# Patient Record
Sex: FEMALE | Race: WHITE | NOT HISPANIC OR LATINO | Employment: OTHER | URBAN - METROPOLITAN AREA
[De-identification: names, ages, dates, MRNs, and addresses within clinical notes are randomized per-mention and may not be internally consistent; named-entity substitution may affect disease eponyms.]

---

## 2018-01-01 ENCOUNTER — HOSPITAL ENCOUNTER (OUTPATIENT)
Dept: ULTRASOUND IMAGING | Facility: CLINIC | Age: 58
Discharge: HOME | End: 2018-07-10
Attending: INTERNAL MEDICINE | Admitting: RADIOLOGY

## 2018-01-01 ENCOUNTER — OFFICE VISIT - HEALTHEAST (OUTPATIENT)
Dept: GERIATRICS | Facility: CLINIC | Age: 58
End: 2018-01-01

## 2018-01-01 ENCOUNTER — HOSPITAL ENCOUNTER (OUTPATIENT)
Dept: ULTRASOUND IMAGING | Facility: CLINIC | Age: 58
Discharge: HOME | End: 2018-09-07
Attending: INTERNAL MEDICINE

## 2018-01-01 ENCOUNTER — AMBULATORY - HEALTHEAST (OUTPATIENT)
Dept: SCHEDULING | Facility: CLINIC | Age: 58
End: 2018-01-01

## 2018-01-01 ENCOUNTER — RECORDS - HEALTHEAST (OUTPATIENT)
Dept: LAB | Facility: CLINIC | Age: 58
End: 2018-01-01

## 2018-01-01 ENCOUNTER — AMBULATORY - HEALTHEAST (OUTPATIENT)
Dept: OTHER | Facility: CLINIC | Age: 58
End: 2018-01-01

## 2018-01-01 ENCOUNTER — HOSPITAL ENCOUNTER (OUTPATIENT)
Dept: ULTRASOUND IMAGING | Facility: CLINIC | Age: 58
Discharge: HOME | End: 2018-12-19
Attending: INTERNAL MEDICINE | Admitting: RADIOLOGY

## 2018-01-01 ENCOUNTER — HOSPITAL ENCOUNTER (OUTPATIENT)
Dept: ULTRASOUND IMAGING | Facility: CLINIC | Age: 58
Discharge: HOME | End: 2018-07-27
Attending: INTERNAL MEDICINE | Admitting: RADIOLOGY

## 2018-01-01 ENCOUNTER — HOSPITAL ENCOUNTER (OUTPATIENT)
Dept: ULTRASOUND IMAGING | Facility: CLINIC | Age: 58
Discharge: HOME | End: 2018-08-07
Attending: INTERNAL MEDICINE | Admitting: RADIOLOGY

## 2018-01-01 ENCOUNTER — HOSPITAL ENCOUNTER (OUTPATIENT)
Dept: ULTRASOUND IMAGING | Facility: CLINIC | Age: 58
Discharge: HOME | End: 2018-07-18
Attending: INTERNAL MEDICINE

## 2018-01-01 ENCOUNTER — HOSPITAL ENCOUNTER (OUTPATIENT)
Dept: ULTRASOUND IMAGING | Facility: CLINIC | Age: 58
Discharge: HOME | End: 2018-12-12
Attending: INTERNAL MEDICINE | Admitting: RADIOLOGY

## 2018-01-01 ENCOUNTER — HOSPITAL ENCOUNTER (OUTPATIENT)
Dept: ULTRASOUND IMAGING | Facility: CLINIC | Age: 58
Discharge: HOME | End: 2018-08-21
Attending: INTERNAL MEDICINE | Admitting: RADIOLOGY

## 2018-01-01 ENCOUNTER — HOSPITAL ENCOUNTER (OUTPATIENT)
Dept: ULTRASOUND IMAGING | Facility: HOSPITAL | Age: 58
Discharge: HOME | End: 2018-10-05
Attending: INTERNAL MEDICINE | Admitting: RADIOLOGY

## 2018-01-01 ENCOUNTER — COMMUNICATION - HEALTHEAST (OUTPATIENT)
Dept: TELEHEALTH | Facility: CLINIC | Age: 58
End: 2018-01-01

## 2018-01-01 ENCOUNTER — RECORDS - HEALTHEAST (OUTPATIENT)
Dept: ADMINISTRATIVE | Facility: OTHER | Age: 58
End: 2018-01-01

## 2018-01-01 ENCOUNTER — HOSPITAL ENCOUNTER (OUTPATIENT)
Dept: ULTRASOUND IMAGING | Facility: CLINIC | Age: 58
Discharge: HOME | End: 2018-08-14
Attending: INTERNAL MEDICINE | Admitting: RADIOLOGY

## 2018-01-01 ENCOUNTER — AMBULATORY - HEALTHEAST (OUTPATIENT)
Dept: LAB | Facility: CLINIC | Age: 58
End: 2018-01-01

## 2018-01-01 ENCOUNTER — HOSPITAL ENCOUNTER (OUTPATIENT)
Dept: ULTRASOUND IMAGING | Facility: CLINIC | Age: 58
Discharge: HOME | End: 2018-08-03
Attending: INTERNAL MEDICINE | Admitting: RADIOLOGY

## 2018-01-01 ENCOUNTER — HOSPITAL ENCOUNTER (OUTPATIENT)
Dept: ULTRASOUND IMAGING | Facility: CLINIC | Age: 58
Discharge: HOME | End: 2018-09-11
Attending: INTERNAL MEDICINE | Admitting: RADIOLOGY

## 2018-01-01 ENCOUNTER — HOSPITAL ENCOUNTER (OUTPATIENT)
Dept: ULTRASOUND IMAGING | Facility: CLINIC | Age: 58
Discharge: HOME | End: 2018-12-05
Attending: INTERNAL MEDICINE | Admitting: RADIOLOGY

## 2018-01-01 ENCOUNTER — HOSPITAL ENCOUNTER (OUTPATIENT)
Dept: ULTRASOUND IMAGING | Facility: CLINIC | Age: 58
Discharge: HOME | End: 2018-09-18
Attending: INTERNAL MEDICINE | Admitting: RADIOLOGY

## 2018-01-01 ENCOUNTER — HOSPITAL ENCOUNTER (OUTPATIENT)
Dept: ULTRASOUND IMAGING | Facility: HOSPITAL | Age: 58
Discharge: HOME | End: 2018-10-03
Attending: INTERNAL MEDICINE | Admitting: RADIOLOGY

## 2018-01-01 ENCOUNTER — HOSPITAL ENCOUNTER (OUTPATIENT)
Dept: ULTRASOUND IMAGING | Facility: HOSPITAL | Age: 58
Discharge: HOME | End: 2018-10-09
Attending: INTERNAL MEDICINE | Admitting: RADIOLOGY

## 2018-01-01 ENCOUNTER — HOSPITAL ENCOUNTER (OUTPATIENT)
Dept: ULTRASOUND IMAGING | Facility: CLINIC | Age: 58
Discharge: HOME | End: 2018-11-09
Attending: INTERNAL MEDICINE | Admitting: RADIOLOGY

## 2018-01-01 ENCOUNTER — HOSPITAL ENCOUNTER (OUTPATIENT)
Dept: ULTRASOUND IMAGING | Facility: HOSPITAL | Age: 58
Discharge: HOME | End: 2018-11-13
Attending: INTERNAL MEDICINE | Admitting: RADIOLOGY

## 2018-01-01 ENCOUNTER — HOSPITAL ENCOUNTER (OUTPATIENT)
Dept: ULTRASOUND IMAGING | Facility: CLINIC | Age: 58
Discharge: HOME | End: 2018-09-25
Attending: INTERNAL MEDICINE | Admitting: RADIOLOGY

## 2018-01-01 ENCOUNTER — HOSPITAL ENCOUNTER (OUTPATIENT)
Dept: ULTRASOUND IMAGING | Facility: CLINIC | Age: 58
Discharge: HOME | End: 2018-09-19
Attending: INTERNAL MEDICINE

## 2018-01-01 ENCOUNTER — HOSPITAL ENCOUNTER (OUTPATIENT)
Dept: ULTRASOUND IMAGING | Facility: CLINIC | Age: 58
Discharge: HOME | End: 2018-11-05
Attending: INTERNAL MEDICINE | Admitting: RADIOLOGY

## 2018-01-01 ENCOUNTER — HOSPITAL ENCOUNTER (OUTPATIENT)
Dept: ULTRASOUND IMAGING | Facility: HOSPITAL | Age: 58
Discharge: HOME | End: 2018-09-28
Attending: INTERNAL MEDICINE | Admitting: RADIOLOGY

## 2018-01-01 ENCOUNTER — HOSPITAL ENCOUNTER (OUTPATIENT)
Dept: ULTRASOUND IMAGING | Facility: CLINIC | Age: 58
Discharge: HOME | End: 2018-08-17
Attending: INTERNAL MEDICINE | Admitting: RADIOLOGY

## 2018-01-01 ENCOUNTER — MEDICAL CORRESPONDENCE (OUTPATIENT)
Dept: HEALTH INFORMATION MANAGEMENT | Facility: CLINIC | Age: 58
End: 2018-01-01

## 2018-01-01 ENCOUNTER — HOSPITAL ENCOUNTER (OUTPATIENT)
Dept: CARDIOLOGY | Facility: CLINIC | Age: 58
Discharge: HOME | End: 2018-07-26
Attending: INTERNAL MEDICINE

## 2018-01-01 ENCOUNTER — HOSPITAL ENCOUNTER (OUTPATIENT)
Dept: ULTRASOUND IMAGING | Facility: CLINIC | Age: 58
Discharge: HOME | End: 2018-06-22
Attending: INTERNAL MEDICINE | Admitting: INTERNAL MEDICINE

## 2018-01-01 ENCOUNTER — REFERRAL (OUTPATIENT)
Dept: TRANSPLANT | Facility: CLINIC | Age: 58
End: 2018-01-01

## 2018-01-01 ENCOUNTER — HOSPITAL ENCOUNTER (OUTPATIENT)
Dept: ULTRASOUND IMAGING | Facility: CLINIC | Age: 58
Discharge: HOME | End: 2018-07-24
Attending: INTERNAL MEDICINE | Admitting: RADIOLOGY

## 2018-01-01 ENCOUNTER — HOSPITAL ENCOUNTER (OUTPATIENT)
Dept: ULTRASOUND IMAGING | Facility: CLINIC | Age: 58
Discharge: HOME | End: 2018-07-20
Attending: INTERNAL MEDICINE | Admitting: RADIOLOGY

## 2018-01-01 ENCOUNTER — HOSPITAL ENCOUNTER (OUTPATIENT)
Dept: ULTRASOUND IMAGING | Facility: CLINIC | Age: 58
Discharge: HOME | End: 2018-09-04
Attending: INTERNAL MEDICINE | Admitting: RADIOLOGY

## 2018-01-01 ENCOUNTER — HOSPITAL ENCOUNTER (OUTPATIENT)
Dept: ULTRASOUND IMAGING | Facility: HOSPITAL | Age: 58
Discharge: HOME | End: 2018-10-22
Attending: INTERNAL MEDICINE | Admitting: RADIOLOGY

## 2018-01-01 ENCOUNTER — HOSPITAL ENCOUNTER (OUTPATIENT)
Dept: ULTRASOUND IMAGING | Facility: CLINIC | Age: 58
Discharge: HOME | End: 2018-11-30
Attending: INTERNAL MEDICINE | Admitting: RADIOLOGY

## 2018-01-01 ENCOUNTER — HOSPITAL ENCOUNTER (OUTPATIENT)
Dept: ULTRASOUND IMAGING | Facility: HOSPITAL | Age: 58
Discharge: HOME | End: 2018-09-21
Attending: INTERNAL MEDICINE | Admitting: RADIOLOGY

## 2018-01-01 ENCOUNTER — HOSPITAL ENCOUNTER (OUTPATIENT)
Dept: ULTRASOUND IMAGING | Facility: HOSPITAL | Age: 58
Discharge: HOME | End: 2018-10-31
Attending: INTERNAL MEDICINE | Admitting: RADIOLOGY

## 2018-01-01 ENCOUNTER — HOSPITAL ENCOUNTER (OUTPATIENT)
Dept: ULTRASOUND IMAGING | Facility: CLINIC | Age: 58
Discharge: HOME | End: 2018-08-28
Attending: INTERNAL MEDICINE | Admitting: RADIOLOGY

## 2018-01-01 ENCOUNTER — HOSPITAL ENCOUNTER (OUTPATIENT)
Dept: ULTRASOUND IMAGING | Facility: HOSPITAL | Age: 58
Discharge: HOME | End: 2018-07-06
Attending: INTERNAL MEDICINE | Admitting: RADIOLOGY

## 2018-01-01 ENCOUNTER — HOSPITAL ENCOUNTER (OUTPATIENT)
Dept: ULTRASOUND IMAGING | Facility: CLINIC | Age: 58
Discharge: HOME | End: 2018-06-26
Attending: INTERNAL MEDICINE | Admitting: RADIOLOGY

## 2018-01-01 ENCOUNTER — COMMUNICATION - HEALTHEAST (OUTPATIENT)
Dept: INTERNAL MEDICINE | Facility: CLINIC | Age: 58
End: 2018-01-01

## 2018-01-01 ENCOUNTER — HOME CARE/HOSPICE - HEALTHEAST (OUTPATIENT)
Dept: HOME HEALTH SERVICES | Facility: HOME HEALTH | Age: 58
End: 2018-01-01

## 2018-01-01 ENCOUNTER — HOSPITAL ENCOUNTER (OUTPATIENT)
Dept: ULTRASOUND IMAGING | Facility: HOSPITAL | Age: 58
Discharge: HOME | End: 2018-10-26
Attending: INTERNAL MEDICINE | Admitting: RADIOLOGY

## 2018-01-01 ENCOUNTER — AMBULATORY - HEALTHEAST (OUTPATIENT)
Dept: GERIATRICS | Facility: CLINIC | Age: 58
End: 2018-01-01

## 2018-01-01 ENCOUNTER — HOSPITAL ENCOUNTER (OUTPATIENT)
Dept: ULTRASOUND IMAGING | Facility: CLINIC | Age: 58
Discharge: HOME | End: 2018-09-14
Attending: INTERNAL MEDICINE

## 2018-01-01 ENCOUNTER — HOSPITAL ENCOUNTER (OUTPATIENT)
Dept: ULTRASOUND IMAGING | Facility: CLINIC | Age: 58
Discharge: HOME | End: 2018-06-12
Attending: INTERNAL MEDICINE | Admitting: RADIOLOGY

## 2018-01-01 ENCOUNTER — HOSPITAL ENCOUNTER (OUTPATIENT)
Dept: ULTRASOUND IMAGING | Facility: CLINIC | Age: 58
Discharge: HOME | End: 2018-08-24
Attending: INTERNAL MEDICINE | Admitting: RADIOLOGY

## 2018-01-01 ENCOUNTER — HOSPITAL ENCOUNTER (OUTPATIENT)
Dept: ULTRASOUND IMAGING | Facility: CLINIC | Age: 58
Discharge: HOME | End: 2018-08-10
Attending: INTERNAL MEDICINE | Admitting: RADIOLOGY

## 2018-01-01 ENCOUNTER — HOSPITAL ENCOUNTER (OUTPATIENT)
Dept: ULTRASOUND IMAGING | Facility: HOSPITAL | Age: 58
Discharge: HOME | End: 2018-10-12
Attending: INTERNAL MEDICINE | Admitting: RADIOLOGY

## 2018-01-01 ENCOUNTER — HOSPITAL ENCOUNTER (OUTPATIENT)
Dept: ULTRASOUND IMAGING | Facility: CLINIC | Age: 58
Discharge: HOME | End: 2018-05-29
Attending: INTERNAL MEDICINE | Admitting: RADIOLOGY

## 2018-01-01 ENCOUNTER — HOSPITAL ENCOUNTER (OUTPATIENT)
Dept: ULTRASOUND IMAGING | Facility: CLINIC | Age: 58
Discharge: HOME | End: 2018-06-29
Attending: INTERNAL MEDICINE | Admitting: RADIOLOGY

## 2018-01-01 ENCOUNTER — HOSPITAL ENCOUNTER (OUTPATIENT)
Dept: ULTRASOUND IMAGING | Facility: CLINIC | Age: 58
Discharge: HOME | End: 2018-12-26
Attending: INTERNAL MEDICINE | Admitting: RADIOLOGY

## 2018-01-01 ENCOUNTER — HOSPITAL ENCOUNTER (OUTPATIENT)
Dept: ULTRASOUND IMAGING | Facility: CLINIC | Age: 58
Discharge: HOME | End: 2018-06-19
Attending: INTERNAL MEDICINE | Admitting: RADIOLOGY

## 2018-01-01 ENCOUNTER — HOSPITAL ENCOUNTER (OUTPATIENT)
Dept: ULTRASOUND IMAGING | Facility: CLINIC | Age: 58
Discharge: HOME | End: 2018-06-15
Attending: INTERNAL MEDICINE | Admitting: RADIOLOGY

## 2018-01-01 ENCOUNTER — HOSPITAL ENCOUNTER (OUTPATIENT)
Dept: ULTRASOUND IMAGING | Facility: CLINIC | Age: 58
Discharge: HOME | End: 2018-07-31
Attending: INTERNAL MEDICINE | Admitting: RADIOLOGY

## 2018-01-01 ENCOUNTER — AMBULATORY - HEALTHEAST (OUTPATIENT)
Dept: FAMILY MEDICINE | Facility: CLINIC | Age: 58
End: 2018-01-01

## 2018-01-01 ENCOUNTER — HOSPITAL ENCOUNTER (OUTPATIENT)
Dept: ULTRASOUND IMAGING | Facility: HOSPITAL | Age: 58
Discharge: HOME | End: 2018-11-16
Attending: INTERNAL MEDICINE | Admitting: RADIOLOGY

## 2018-01-01 ENCOUNTER — HOSPITAL ENCOUNTER (OUTPATIENT)
Dept: ULTRASOUND IMAGING | Facility: CLINIC | Age: 58
Discharge: HOME | End: 2018-07-17
Attending: INTERNAL MEDICINE | Admitting: RADIOLOGY

## 2018-01-01 ENCOUNTER — HOSPITAL ENCOUNTER (OUTPATIENT)
Dept: ULTRASOUND IMAGING | Facility: CLINIC | Age: 58
Discharge: HOME | End: 2018-06-05
Attending: INTERNAL MEDICINE | Admitting: RADIOLOGY

## 2018-01-01 ENCOUNTER — HOSPITAL ENCOUNTER (OUTPATIENT)
Dept: ULTRASOUND IMAGING | Facility: CLINIC | Age: 58
Discharge: HOME | End: 2018-07-13
Attending: INTERNAL MEDICINE | Admitting: RADIOLOGY

## 2018-01-01 ENCOUNTER — HOSPITAL ENCOUNTER (OUTPATIENT)
Dept: ULTRASOUND IMAGING | Facility: CLINIC | Age: 58
Discharge: HOME | End: 2018-08-31
Attending: INTERNAL MEDICINE | Admitting: RADIOLOGY

## 2018-01-01 DIAGNOSIS — K70.31 ALCOHOLIC CIRRHOSIS OF LIVER WITH ASCITES (H): ICD-10-CM

## 2018-01-01 DIAGNOSIS — E87.6 HYPOKALEMIA: ICD-10-CM

## 2018-01-01 DIAGNOSIS — E83.42 HYPOMAGNESEMIA: ICD-10-CM

## 2018-01-01 DIAGNOSIS — K70.31 ASCITES DUE TO ALCOHOLIC CIRRHOSIS (H): ICD-10-CM

## 2018-01-01 DIAGNOSIS — R60.9 DEPENDENT EDEMA: ICD-10-CM

## 2018-01-01 DIAGNOSIS — E83.119 HEMOCHROMATOSIS, UNSPECIFIED HEMOCHROMATOSIS TYPE: ICD-10-CM

## 2018-01-01 DIAGNOSIS — K74.60 CIRRHOSIS (H): ICD-10-CM

## 2018-01-01 DIAGNOSIS — F51.02 INSOMNIA DUE TO STRESS: ICD-10-CM

## 2018-01-01 DIAGNOSIS — E83.119 HEMOCHROMATOSIS: ICD-10-CM

## 2018-01-01 DIAGNOSIS — I85.00 ESOPHAGEAL VARICES WITHOUT BLEEDING (H): ICD-10-CM

## 2018-01-01 DIAGNOSIS — I95.9 HYPOTENSION: ICD-10-CM

## 2018-01-01 DIAGNOSIS — F43.22 ADJUSTMENT DISORDER WITH ANXIOUS MOOD: ICD-10-CM

## 2018-01-01 DIAGNOSIS — D62 ACUTE BLOOD LOSS ANEMIA: ICD-10-CM

## 2018-01-01 DIAGNOSIS — K76.82 HEPATIC ENCEPHALOPATHY (H): ICD-10-CM

## 2018-01-01 DIAGNOSIS — N30.00 ACUTE CYSTITIS WITHOUT HEMATURIA: ICD-10-CM

## 2018-01-01 DIAGNOSIS — E46 PROTEIN-CALORIE MALNUTRITION (H): ICD-10-CM

## 2018-01-01 DIAGNOSIS — F06.30 MOOD DISORDER DUE TO A GENERAL MEDICAL CONDITION: ICD-10-CM

## 2018-01-01 DIAGNOSIS — F41.9 ANXIETY DISORDER, UNSPECIFIED TYPE: ICD-10-CM

## 2018-01-01 DIAGNOSIS — R00.0 TACHYCARDIA: ICD-10-CM

## 2018-01-01 DIAGNOSIS — I10 ELEVATED BLOOD PRESSURE READING IN OFFICE WITH DIAGNOSIS OF HYPERTENSION: ICD-10-CM

## 2018-01-01 DIAGNOSIS — I85.10 SECONDARY ESOPHAGEAL VARICES WITHOUT BLEEDING (H): ICD-10-CM

## 2018-01-01 DIAGNOSIS — K76.6 PORTAL HYPERTENSION (H): ICD-10-CM

## 2018-01-01 LAB
ALBUMIN FLD-MCNC: <0.6 G/DL
ALBUMIN SERPL-MCNC: 1.5 G/DL (ref 3.5–5)
ALBUMIN SERPL-MCNC: 1.8 G/DL (ref 3.5–5)
ALBUMIN SERPL-MCNC: 2.9 G/DL (ref 3.5–5)
ALBUMIN SERPL-MCNC: 3.2 G/DL (ref 3.5–5)
ALP SERPL-CCNC: 169 U/L (ref 45–120)
ALP SERPL-CCNC: 179 U/L (ref 45–120)
ALP SERPL-CCNC: 186 U/L (ref 45–120)
ALP SERPL-CCNC: 217 U/L (ref 45–120)
ALT SERPL W P-5'-P-CCNC: 19 U/L (ref 0–45)
ALT SERPL W P-5'-P-CCNC: 26 U/L (ref 0–45)
ALT SERPL W P-5'-P-CCNC: 27 U/L (ref 0–45)
ALT SERPL W P-5'-P-CCNC: 27 U/L (ref 0–45)
AMMONIA PLAS-SCNC: 38 UMOL/L (ref 11–35)
ANION GAP SERPL CALCULATED.3IONS-SCNC: 10 MMOL/L (ref 5–18)
ANION GAP SERPL CALCULATED.3IONS-SCNC: 11 MMOL/L (ref 5–18)
ANION GAP SERPL CALCULATED.3IONS-SCNC: 12 MMOL/L (ref 5–18)
ANION GAP SERPL CALCULATED.3IONS-SCNC: 4 MMOL/L (ref 5–18)
ANION GAP SERPL CALCULATED.3IONS-SCNC: 8 MMOL/L (ref 5–18)
ANION GAP SERPL CALCULATED.3IONS-SCNC: 8 MMOL/L (ref 5–18)
ANION GAP SERPL CALCULATED.3IONS-SCNC: 9 MMOL/L (ref 5–18)
ANION GAP SERPL CALCULATED.3IONS-SCNC: 9 MMOL/L (ref 5–18)
AORTIC ROOT: 3.1 CM
APPEARANCE FLD: ABNORMAL
AST SERPL W P-5'-P-CCNC: 39 U/L (ref 0–40)
AST SERPL W P-5'-P-CCNC: 58 U/L (ref 0–40)
AST SERPL W P-5'-P-CCNC: 96 U/L (ref 0–40)
AST SERPL W P-5'-P-CCNC: 97 U/L (ref 0–40)
BACTERIA SPEC CULT: NO GROWTH
BASOPHILS # BLD AUTO: 0 THOU/UL (ref 0–0.2)
BASOPHILS NFR BLD AUTO: 1 % (ref 0–2)
BILIRUB DIRECT SERPL-MCNC: 1.2 MG/DL
BILIRUB DIRECT SERPL-MCNC: 1.4 MG/DL
BILIRUB SERPL-MCNC: 2.3 MG/DL (ref 0–1)
BILIRUB SERPL-MCNC: 2.8 MG/DL (ref 0–1)
BILIRUB SERPL-MCNC: 7.6 MG/DL (ref 0–1)
BILIRUB SERPL-MCNC: 8.5 MG/DL (ref 0–1)
BSA FOR ECHO PROCEDURE: 1.77 M2
BUN SERPL-MCNC: 10 MG/DL (ref 8–22)
BUN SERPL-MCNC: 11 MG/DL (ref 8–28)
BUN SERPL-MCNC: 12 MG/DL (ref 8–22)
BUN SERPL-MCNC: 6 MG/DL (ref 8–22)
BUN SERPL-MCNC: 6 MG/DL (ref 8–22)
BUN SERPL-MCNC: 7 MG/DL (ref 8–22)
BUN SERPL-MCNC: 9 MG/DL (ref 8–22)
CALCIUM SERPL-MCNC: 7.8 MG/DL (ref 8.5–10.5)
CALCIUM SERPL-MCNC: 8.1 MG/DL (ref 8.5–10.5)
CALCIUM SERPL-MCNC: 8.2 MG/DL (ref 8.5–10.5)
CALCIUM SERPL-MCNC: 8.7 MG/DL (ref 8.5–10.5)
CALCIUM SERPL-MCNC: 8.8 MG/DL (ref 8.5–10.5)
CALCIUM SERPL-MCNC: 8.9 MG/DL (ref 8.5–10.5)
CALCIUM SERPL-MCNC: 9.1 MG/DL (ref 8.5–10.5)
CALCIUM SERPL-MCNC: 9.1 MG/DL (ref 8.5–10.5)
CALCIUM SERPL-MCNC: 9.4 MG/DL (ref 8.5–10.5)
CALCIUM SERPL-MCNC: 9.5 MG/DL (ref 8.5–10.5)
CALCIUM SERPL-MCNC: 9.6 MG/DL (ref 8.5–10.5)
CALCIUM SERPL-MCNC: 9.6 MG/DL (ref 8.5–10.5)
CHLORIDE BLD-SCNC: 101 MMOL/L (ref 98–107)
CHLORIDE BLD-SCNC: 101 MMOL/L (ref 98–107)
CHLORIDE BLD-SCNC: 102 MMOL/L (ref 98–107)
CHLORIDE BLD-SCNC: 103 MMOL/L (ref 98–107)
CHLORIDE BLD-SCNC: 104 MMOL/L (ref 98–107)
CHLORIDE BLD-SCNC: 107 MMOL/L (ref 98–107)
CHLORIDE BLD-SCNC: 98 MMOL/L (ref 98–107)
CHLORIDE BLD-SCNC: 99 MMOL/L (ref 98–107)
CHLORIDE BLD-SCNC: 99 MMOL/L (ref 98–107)
CO2 SERPL-SCNC: 22 MMOL/L (ref 22–31)
CO2 SERPL-SCNC: 24 MMOL/L (ref 22–31)
CO2 SERPL-SCNC: 26 MMOL/L (ref 22–31)
CO2 SERPL-SCNC: 26 MMOL/L (ref 22–31)
CO2 SERPL-SCNC: 27 MMOL/L (ref 22–31)
COLOR FLD: YELLOW
CREAT SERPL-MCNC: 0.59 MG/DL (ref 0.6–1.1)
CREAT SERPL-MCNC: 0.62 MG/DL (ref 0.6–1.1)
CREAT SERPL-MCNC: 0.66 MG/DL (ref 0.6–1.1)
CREAT SERPL-MCNC: 0.68 MG/DL (ref 0.6–1.1)
CREAT SERPL-MCNC: 0.7 MG/DL (ref 0.6–1.1)
CREAT SERPL-MCNC: 0.71 MG/DL (ref 0.6–1.1)
CREAT SERPL-MCNC: 0.74 MG/DL (ref 0.6–1.1)
CREAT SERPL-MCNC: 0.75 MG/DL (ref 0.6–1.1)
CREAT SERPL-MCNC: 0.76 MG/DL (ref 0.6–1.1)
CREAT SERPL-MCNC: 0.76 MG/DL (ref 0.6–1.1)
CREAT SERPL-MCNC: 0.79 MG/DL (ref 0.6–1.1)
CREAT SERPL-MCNC: 0.82 MG/DL (ref 0.6–1.1)
CV BLOOD PRESSURE: NORMAL MMHG
CV ECHO HEIGHT: 67.5 IN
CV ECHO WEIGHT: 146 LBS
DOP CALC LVOT AREA: 3.14 CM2
DOP CALC LVOT DIAMETER: 2 CM
DOP CALC LVOT PEAK VEL: 105 CM/S
DOP CALC LVOT STROKE VOLUME: 64.7 CM3
DOP CALC MV VTI: 26.8 CM
DOP CALCLVOT PEAK VEL VTI: 20.6 CM
EJECTION FRACTION: 67 % (ref 55–75)
EOSINOPHIL # BLD AUTO: 0.1 THOU/UL (ref 0–0.4)
EOSINOPHIL NFR BLD AUTO: 3 % (ref 0–6)
EOSINOPHIL NFR FLD MANUAL: ABNORMAL %
ERYTHROCYTE [DISTWIDTH] IN BLOOD BY AUTOMATED COUNT: 17.6 % (ref 11–14.5)
ERYTHROCYTE [DISTWIDTH] IN BLOOD BY AUTOMATED COUNT: 18.3 % (ref 11–14.5)
ERYTHROCYTE [DISTWIDTH] IN BLOOD BY AUTOMATED COUNT: 19.3 % (ref 11–14.5)
ERYTHROCYTE [DISTWIDTH] IN BLOOD BY AUTOMATED COUNT: 20.2 % (ref 11–14.5)
ERYTHROCYTE [DISTWIDTH] IN BLOOD BY AUTOMATED COUNT: 26.7 % (ref 11–14.5)
FERRITIN SERPL-MCNC: 29 NG/ML (ref 10–130)
FRACTIONAL SHORTENING: 47.4 % (ref 28–44)
GFR SERPL CREATININE-BSD FRML MDRD: >60 ML/MIN/1.73M2
GLUCOSE BLD-MCNC: 101 MG/DL (ref 70–125)
GLUCOSE BLD-MCNC: 108 MG/DL (ref 70–125)
GLUCOSE BLD-MCNC: 112 MG/DL (ref 70–125)
GLUCOSE BLD-MCNC: 125 MG/DL (ref 70–125)
GLUCOSE BLD-MCNC: 129 MG/DL (ref 70–125)
GLUCOSE BLD-MCNC: 137 MG/DL (ref 70–125)
GLUCOSE BLD-MCNC: 146 MG/DL (ref 70–125)
GLUCOSE BLD-MCNC: 159 MG/DL (ref 70–125)
GLUCOSE BLD-MCNC: 168 MG/DL (ref 70–125)
GLUCOSE BLD-MCNC: 176 MG/DL (ref 70–125)
GLUCOSE BLD-MCNC: 205 MG/DL (ref 70–125)
GLUCOSE BLD-MCNC: 94 MG/DL (ref 70–125)
GRAM STAIN RESULT: NORMAL
GRAM STAIN RESULT: NORMAL
HCT VFR BLD AUTO: 27 % (ref 35–47)
HCT VFR BLD AUTO: 30.7 % (ref 35–47)
HCT VFR BLD AUTO: 31.9 % (ref 35–47)
HCT VFR BLD AUTO: 33.9 % (ref 35–47)
HCT VFR BLD AUTO: 34.1 % (ref 35–47)
HGB BLD-MCNC: 10 G/DL (ref 12–16)
HGB BLD-MCNC: 10.1 G/DL (ref 12–16)
HGB BLD-MCNC: 10.2 G/DL (ref 12–16)
HGB BLD-MCNC: 10.6 G/DL (ref 12–16)
HGB BLD-MCNC: 8.5 G/DL (ref 12–16)
HGB BLD-MCNC: 8.9 G/DL (ref 12–16)
HGB BLD-MCNC: 9.3 G/DL (ref 12–16)
INR PPP: 1.24 (ref 0.9–1.1)
INR PPP: 1.25 (ref 0.9–1.1)
INR PPP: 1.27 (ref 0.9–1.1)
INR PPP: 1.31 (ref 0.9–1.1)
INR PPP: 1.36 (ref 0.9–1.1)
INR PPP: 1.41 (ref 0.9–1.1)
INR PPP: 1.48 (ref 0.9–1.1)
INR PPP: 1.55 (ref 0.9–1.1)
INR PPP: 1.66 (ref 0.9–1.1)
INR PPP: 1.85 (ref 0.9–1.1)
INR PPP: 2.07 (ref 0.9–1.1)
INR PPP: 2.2 (ref 0.9–1.1)
INR PPP: 2.42 (ref 0.9–1.1)
INR PPP: 2.52 (ref 0.9–1.1)
INR PPP: 3.14 (ref 0.9–1.1)
INTERVENTRICULAR SEPTUM IN END DIASTOLE: 0.88 CM (ref 0.6–0.9)
IRON SERPL-MCNC: 34 UG/DL (ref 42–175)
IVS/PW RATIO: 1
LAB AP CHARGES (HE HISTORICAL CONVERSION): NORMAL
LAB MED GENERAL PATH INTERP (HE HISTORICAL CONVERSION): NORMAL
LEFT ATRIUM SIZE: 4 CM
LEFT ATRIUM TO AORTIC ROOT RATIO: 1.29 NO UNITS
LEFT VENTRICLE CARDIAC INDEX: 3.3 L/MIN/M2
LEFT VENTRICLE CARDIAC OUTPUT: 5.9 L/MIN
LEFT VENTRICLE DIASTOLIC VOLUME INDEX: 49.3 CM3/M2 (ref 34–74)
LEFT VENTRICLE DIASTOLIC VOLUME: 87.2 CM3 (ref 46–106)
LEFT VENTRICLE HEART RATE: 91 BPM
LEFT VENTRICLE MASS INDEX: 99.5 G/M2
LEFT VENTRICLE SYSTOLIC VOLUME INDEX: 16.3 CM3/M2 (ref 11–31)
LEFT VENTRICLE SYSTOLIC VOLUME: 28.9 CM3 (ref 14–42)
LEFT VENTRICULAR INTERNAL DIMENSION IN DIASTOLE: 5.49 CM (ref 3.8–5.2)
LEFT VENTRICULAR INTERNAL DIMENSION IN SYSTOLE: 2.89 CM (ref 2.2–3.5)
LEFT VENTRICULAR MASS: 176.1 G
LEFT VENTRICULAR OUTFLOW TRACT MEAN GRADIENT: 2 MMHG
LEFT VENTRICULAR OUTFLOW TRACT MEAN VELOCITY: 68 CM/S
LEFT VENTRICULAR OUTFLOW TRACT PEAK GRADIENT: 4 MMHG
LEFT VENTRICULAR POSTERIOR WALL IN END DIASTOLE: 0.85 CM (ref 0.6–0.9)
LV STROKE VOLUME INDEX: 36.5 ML/M2
LYMPHOCYTES # BLD AUTO: 0.3 THOU/UL (ref 0.8–4.4)
LYMPHOCYTES NFR BLD AUTO: 10 % (ref 20–40)
LYMPHOCYTES NFR FLD MANUAL: 10 %
MACROPHAGE % - HISTORICAL: 74 %
MAGNESIUM SERPL-MCNC: 1.1 MG/DL (ref 1.8–2.6)
MAGNESIUM SERPL-MCNC: 1.3 MG/DL (ref 1.8–2.6)
MAGNESIUM SERPL-MCNC: 1.3 MG/DL (ref 1.8–2.6)
MAGNESIUM SERPL-MCNC: 1.4 MG/DL (ref 1.8–2.6)
MAGNESIUM SERPL-MCNC: 1.5 MG/DL (ref 1.8–2.6)
MAGNESIUM SERPL-MCNC: 1.5 MG/DL (ref 1.8–2.6)
MAGNESIUM SERPL-MCNC: 1.7 MG/DL (ref 1.8–2.6)
MAGNESIUM SERPL-MCNC: 2.1 MG/DL (ref 1.8–2.6)
MCH RBC QN AUTO: 26.6 PG (ref 27–34)
MCH RBC QN AUTO: 29.5 PG (ref 27–34)
MCH RBC QN AUTO: 32 PG (ref 27–34)
MCH RBC QN AUTO: 37.6 PG (ref 27–34)
MCH RBC QN AUTO: 38.1 PG (ref 27–34)
MCHC RBC AUTO-ENTMCNC: 29.6 G/DL (ref 32–36)
MCHC RBC AUTO-ENTMCNC: 30.3 G/DL (ref 32–36)
MCHC RBC AUTO-ENTMCNC: 31.3 G/DL (ref 32–36)
MCHC RBC AUTO-ENTMCNC: 31.5 G/DL (ref 32–36)
MCHC RBC AUTO-ENTMCNC: 32 G/DL (ref 32–36)
MCV RBC AUTO: 100 FL (ref 80–100)
MCV RBC AUTO: 102 FL (ref 80–100)
MCV RBC AUTO: 119 FL (ref 80–100)
MCV RBC AUTO: 120 FL (ref 80–100)
MCV RBC AUTO: 88 FL (ref 80–100)
MESOTHELIALS, FLUID: 6 %
MITRAL VALVE E/A RATIO: 0.8
MITRAL VALVE MEAN INFLOW VELOCITY: 86.7 CM/S
MITRAL VALVE PEAK VELOCITY: 116 CM/S
MONOCYTE % - HISTORICAL: ABNORMAL %
MONOCYTES # BLD AUTO: 0.5 THOU/UL (ref 0–0.9)
MONOCYTES NFR BLD AUTO: 17 % (ref 2–10)
MV AREA VTI: 2.41 CM2
MV AVERAGE E/E' RATIO: 7.1 CM/S
MV DECELERATION TIME: 232 MS
MV E'TISSUE VEL-LAT: 11.8 CM/S
MV E'TISSUE VEL-MED: 11.1 CM/S
MV LATERAL E/E' RATIO: 6.9
MV MEAN GRADIENT: 3 MMHG
MV MEDIAL E/E' RATIO: 7.3
MV PEAK A VELOCITY: 98 CM/S
MV PEAK E VELOCITY: 81.5 CM/S
MV PEAK GRADIENT: 5.4 MMHG
MV VALVE AREA BY CONTINUITY EQUATION: 2.4 CM2
NEUTROPHILS # BLD AUTO: 2 THOU/UL (ref 2–7.7)
NEUTROPHILS NFR BLD AUTO: 70 % (ref 50–70)
NEUTS BAND NFR FLD MANUAL: 10 %
NUC REST DIASTOLIC VOLUME INDEX: 2336 LBS
NUC REST SYSTOLIC VOLUME INDEX: 67.5 IN
OTHER CELLS FLD MANUAL: ABNORMAL %
PATH REPORT.COMMENTS IMP SPEC: NORMAL
PATH REPORT.FINAL DX SPEC: NORMAL
PATH REPORT.RELEVANT HX SPEC: NORMAL
PLATELET # BLD AUTO: 104 THOU/UL (ref 140–440)
PLATELET # BLD AUTO: 133 THOU/UL (ref 140–440)
PLATELET # BLD AUTO: 138 THOU/UL (ref 140–440)
PLATELET # BLD AUTO: 79 THOU/UL (ref 140–440)
PLATELET # BLD AUTO: 93 THOU/UL (ref 140–440)
PMV BLD AUTO: 10 FL (ref 8.5–12.5)
PMV BLD AUTO: 9.2 FL (ref 8.5–12.5)
PMV BLD AUTO: 9.3 FL (ref 8.5–12.5)
PMV BLD AUTO: 9.3 FL (ref 8.5–12.5)
PMV BLD AUTO: 9.6 FL (ref 8.5–12.5)
POTASSIUM BLD-SCNC: 2.6 MMOL/L (ref 3.5–5)
POTASSIUM BLD-SCNC: 2.8 MMOL/L (ref 3.5–5)
POTASSIUM BLD-SCNC: 3 MMOL/L (ref 3.5–5)
POTASSIUM BLD-SCNC: 3.3 MMOL/L (ref 3.5–5)
POTASSIUM BLD-SCNC: 3.3 MMOL/L (ref 3.5–5)
POTASSIUM BLD-SCNC: 3.5 MMOL/L (ref 3.5–5)
POTASSIUM BLD-SCNC: 3.5 MMOL/L (ref 3.5–5)
POTASSIUM BLD-SCNC: 4 MMOL/L (ref 3.5–5)
POTASSIUM BLD-SCNC: 4 MMOL/L (ref 3.5–5)
POTASSIUM BLD-SCNC: 4.1 MMOL/L (ref 3.5–5)
POTASSIUM BLD-SCNC: 4.4 MMOL/L (ref 3.5–5)
POTASSIUM BLD-SCNC: 4.6 MMOL/L (ref 3.5–5)
POTASSIUM BLD-SCNC: 4.7 MMOL/L (ref 3.5–5)
POTASSIUM BLD-SCNC: 4.9 MMOL/L (ref 3.5–5)
PROT FLD-MCNC: 0.8 G/DL
PROT SERPL-MCNC: 5.8 G/DL (ref 6–8)
PROT SERPL-MCNC: 6.2 G/DL (ref 6–8)
PROT SERPL-MCNC: 6.9 G/DL (ref 6–8)
PROT SERPL-MCNC: 7.4 G/DL (ref 6–8)
RBC # BLD AUTO: 2.26 MILL/UL (ref 3.8–5.4)
RBC # BLD AUTO: 2.68 MILL/UL (ref 3.8–5.4)
RBC # BLD AUTO: 3.31 MILL/UL (ref 3.8–5.4)
RBC # BLD AUTO: 3.42 MILL/UL (ref 3.8–5.4)
RBC # BLD AUTO: 3.49 MILL/UL (ref 3.8–5.4)
RBC FLUID - HISTORICAL: ABNORMAL /UL
RESULT FLAG (HE HISTORICAL CONVERSION): NORMAL
SODIUM SERPL-SCNC: 132 MMOL/L (ref 136–145)
SODIUM SERPL-SCNC: 133 MMOL/L (ref 136–145)
SODIUM SERPL-SCNC: 134 MMOL/L (ref 136–145)
SODIUM SERPL-SCNC: 134 MMOL/L (ref 136–145)
SODIUM SERPL-SCNC: 135 MMOL/L (ref 136–145)
SODIUM SERPL-SCNC: 137 MMOL/L (ref 136–145)
SODIUM SERPL-SCNC: 138 MMOL/L (ref 136–145)
SODIUM SERPL-SCNC: 139 MMOL/L (ref 136–145)
SPECIMEN DESCRIPTION: NORMAL
TRICUSPID REGURGITATION PEAK PRESSURE GRADIENT: 23.6 MMHG
TRICUSPID VALVE ANULAR PLANE SYSTOLIC EXCURSION: 2.2 CM
TRICUSPID VALVE PEAK REGURGITANT VELOCITY: 243 CM/S
WBC # FLD AUTO: 160 /UL (ref 0–99)
WBC: 2.8 THOU/UL (ref 4–11)
WBC: 3.5 THOU/UL (ref 4–11)
WBC: 3.6 THOU/UL (ref 4–11)
WBC: 3.9 THOU/UL (ref 4–11)
WBC: 4.8 THOU/UL (ref 4–11)

## 2018-01-01 ASSESSMENT — MIFFLIN-ST. JEOR: SCORE: 1267.81

## 2018-04-02 ENCOUNTER — HOME CARE/HOSPICE - HEALTHEAST (OUTPATIENT)
Dept: HOME HEALTH SERVICES | Facility: HOME HEALTH | Age: 58
End: 2018-04-02

## 2018-04-02 ENCOUNTER — AMBULATORY - HEALTHEAST (OUTPATIENT)
Dept: LAB | Facility: CLINIC | Age: 58
End: 2018-04-02

## 2018-04-06 ENCOUNTER — HOME CARE/HOSPICE - HEALTHEAST (OUTPATIENT)
Dept: HOME HEALTH SERVICES | Facility: HOME HEALTH | Age: 58
End: 2018-04-06

## 2018-04-08 ENCOUNTER — HOME CARE/HOSPICE - HEALTHEAST (OUTPATIENT)
Dept: HOME HEALTH SERVICES | Facility: HOME HEALTH | Age: 58
End: 2018-04-08

## 2018-04-09 ENCOUNTER — HOME CARE/HOSPICE - HEALTHEAST (OUTPATIENT)
Dept: HOME HEALTH SERVICES | Facility: HOME HEALTH | Age: 58
End: 2018-04-09

## 2018-04-09 ENCOUNTER — RECORDS - HEALTHEAST (OUTPATIENT)
Dept: LAB | Facility: CLINIC | Age: 58
End: 2018-04-09

## 2018-04-09 LAB
ALBUMIN SERPL-MCNC: 3 G/DL (ref 3.5–5)
ALP SERPL-CCNC: 163 U/L (ref 45–120)
ALT SERPL W P-5'-P-CCNC: 35 U/L (ref 0–45)
ANION GAP SERPL CALCULATED.3IONS-SCNC: 14 MMOL/L (ref 5–18)
AST SERPL W P-5'-P-CCNC: 79 U/L (ref 0–40)
BILIRUB SERPL-MCNC: 5.9 MG/DL (ref 0–1)
BUN SERPL-MCNC: 10 MG/DL (ref 8–22)
CALCIUM SERPL-MCNC: 9.5 MG/DL (ref 8.5–10.5)
CHLORIDE BLD-SCNC: 96 MMOL/L (ref 98–107)
CO2 SERPL-SCNC: 23 MMOL/L (ref 22–31)
CREAT SERPL-MCNC: 0.72 MG/DL (ref 0.6–1.1)
GFR SERPL CREATININE-BSD FRML MDRD: >60 ML/MIN/1.73M2
GLUCOSE BLD-MCNC: 118 MG/DL (ref 70–125)
POTASSIUM BLD-SCNC: 3.9 MMOL/L (ref 3.5–5)
PROT SERPL-MCNC: 7.4 G/DL (ref 6–8)
SODIUM SERPL-SCNC: 133 MMOL/L (ref 136–145)

## 2018-04-10 ENCOUNTER — HOME CARE/HOSPICE - HEALTHEAST (OUTPATIENT)
Dept: HOME HEALTH SERVICES | Facility: HOME HEALTH | Age: 58
End: 2018-04-10

## 2018-04-10 ENCOUNTER — HOSPITAL ENCOUNTER (OUTPATIENT)
Dept: ULTRASOUND IMAGING | Facility: CLINIC | Age: 58
Discharge: HOME | End: 2018-04-10
Admitting: RADIOLOGY

## 2018-04-10 DIAGNOSIS — K70.31 ASCITES DUE TO ALCOHOLIC CIRRHOSIS (H): ICD-10-CM

## 2018-04-12 ENCOUNTER — RECORDS - HEALTHEAST (OUTPATIENT)
Dept: ADMINISTRATIVE | Facility: OTHER | Age: 58
End: 2018-04-12

## 2018-04-12 ENCOUNTER — HOME CARE/HOSPICE - HEALTHEAST (OUTPATIENT)
Dept: HOME HEALTH SERVICES | Facility: HOME HEALTH | Age: 58
End: 2018-04-12

## 2018-04-13 ENCOUNTER — HOME CARE/HOSPICE - HEALTHEAST (OUTPATIENT)
Dept: HOME HEALTH SERVICES | Facility: HOME HEALTH | Age: 58
End: 2018-04-13

## 2018-04-14 ENCOUNTER — HOME CARE/HOSPICE - HEALTHEAST (OUTPATIENT)
Dept: HOME HEALTH SERVICES | Facility: HOME HEALTH | Age: 58
End: 2018-04-14

## 2018-04-16 ENCOUNTER — HOME CARE/HOSPICE - HEALTHEAST (OUTPATIENT)
Dept: HOME HEALTH SERVICES | Facility: HOME HEALTH | Age: 58
End: 2018-04-16

## 2018-04-18 ENCOUNTER — HOME CARE/HOSPICE - HEALTHEAST (OUTPATIENT)
Dept: HOME HEALTH SERVICES | Facility: HOME HEALTH | Age: 58
End: 2018-04-18

## 2018-04-20 ENCOUNTER — HOSPITAL ENCOUNTER (OUTPATIENT)
Dept: ULTRASOUND IMAGING | Facility: CLINIC | Age: 58
Discharge: HOME | End: 2018-04-20
Attending: INTERNAL MEDICINE | Admitting: RADIOLOGY

## 2018-04-20 DIAGNOSIS — K70.31 ALCOHOLIC CIRRHOSIS OF LIVER WITH ASCITES (H): ICD-10-CM

## 2018-04-23 ENCOUNTER — RECORDS - HEALTHEAST (OUTPATIENT)
Dept: LAB | Facility: CLINIC | Age: 58
End: 2018-04-23

## 2018-04-23 LAB
ERYTHROCYTE [DISTWIDTH] IN BLOOD BY AUTOMATED COUNT: 18.5 % (ref 11–14.5)
HCT VFR BLD AUTO: 30.8 % (ref 35–47)
HGB BLD-MCNC: 9.4 G/DL (ref 12–16)
INR PPP: 1.34 (ref 0.9–1.1)
IRON SATN MFR SERPL: 15 % (ref 20–50)
IRON SERPL-MCNC: 45 UG/DL (ref 42–175)
MCH RBC QN AUTO: 29.1 PG (ref 27–34)
MCHC RBC AUTO-ENTMCNC: 30.5 G/DL (ref 32–36)
MCV RBC AUTO: 95 FL (ref 80–100)
PLATELET # BLD AUTO: 169 THOU/UL (ref 140–440)
PMV BLD AUTO: 10.1 FL (ref 8.5–12.5)
RBC # BLD AUTO: 3.23 MILL/UL (ref 3.8–5.4)
T4 FREE SERPL-MCNC: 1.1 NG/DL (ref 0.7–1.8)
TIBC SERPL-MCNC: 302 UG/DL (ref 313–563)
TRANSFERRIN SERPL-MCNC: 241 MG/DL (ref 212–360)
TSH SERPL DL<=0.005 MIU/L-ACNC: 0.65 UIU/ML (ref 0.3–5)
VIT B12 SERPL-MCNC: 1045 PG/ML (ref 213–816)
WBC: 5.2 THOU/UL (ref 4–11)

## 2018-04-24 ENCOUNTER — HOME CARE/HOSPICE - HEALTHEAST (OUTPATIENT)
Dept: HOME HEALTH SERVICES | Facility: HOME HEALTH | Age: 58
End: 2018-04-24

## 2018-04-24 ENCOUNTER — AMBULATORY - HEALTHEAST (OUTPATIENT)
Dept: SCHEDULING | Facility: CLINIC | Age: 58
End: 2018-04-24

## 2018-04-24 DIAGNOSIS — K70.31 ALCOHOLIC CIRRHOSIS OF LIVER WITH ASCITES (H): ICD-10-CM

## 2018-05-04 ENCOUNTER — HOSPITAL ENCOUNTER (OUTPATIENT)
Dept: ULTRASOUND IMAGING | Facility: CLINIC | Age: 58
Discharge: HOME | End: 2018-05-04
Attending: INTERNAL MEDICINE | Admitting: RADIOLOGY

## 2018-05-04 DIAGNOSIS — K70.31 ALCOHOLIC CIRRHOSIS OF LIVER WITH ASCITES (H): ICD-10-CM

## 2018-12-10 NOTE — LETTER
January 10, 2019    Dear Herminia Dewitt,    You were referred to [facility] for liver transplant evaluation. Our transplant team and the entire [facility] staff are committed to promoting wellness and excellence in care for each individual.     We understand you are deferring a hepatology consult or evaluation at this time. If at any time you want to re visit this decision please contact Lizz Hernandez, liver transplant coordinator, at [phone] to discuss scheduling a visit. We will await your return call, and look forward to hearing from you.     Sincerely,     Lizz Hernandez, FIORDALIZAN,RN  Adult Liver Coordinator  [phone]

## 2018-12-20 NOTE — TELEPHONE ENCOUNTER
Spoke with Herminia and wants to wait until after the holiday and then decide if she wants to proceed with hepatology consult or transplant evaluation. This RN updated her referring provider, [facility]:  Popeye Ibarra MD Referring Physician

## 2018-12-21 NOTE — TELEPHONE ENCOUNTER
December 13, 2018 11:16 AM -  CDURANT1:   Spoke with patient and she is not wanting to proceed with Liver Transplant Referral. Will let patients RN Coordinator know.

## 2019-01-01 ENCOUNTER — TRANSFERRED RECORDS (OUTPATIENT)
Dept: HEALTH INFORMATION MANAGEMENT | Facility: CLINIC | Age: 59
End: 2019-01-01

## 2019-01-01 ENCOUNTER — HOSPITAL ENCOUNTER (OUTPATIENT)
Dept: ULTRASOUND IMAGING | Facility: CLINIC | Age: 59
Discharge: HOME | End: 2019-04-22
Attending: INTERNAL MEDICINE | Admitting: RADIOLOGY

## 2019-01-01 ENCOUNTER — RECORDS - HEALTHEAST (OUTPATIENT)
Dept: ADMINISTRATIVE | Facility: OTHER | Age: 59
End: 2019-01-01

## 2019-01-01 ENCOUNTER — HOSPITAL ENCOUNTER (OUTPATIENT)
Facility: CLINIC | Age: 59
Setting detail: SPECIMEN
DRG: 432 | End: 2019-05-13
Attending: INTERNAL MEDICINE | Admitting: INTERNAL MEDICINE
Payer: MEDICARE

## 2019-01-01 ENCOUNTER — HOSPITAL ENCOUNTER (OUTPATIENT)
Dept: ULTRASOUND IMAGING | Facility: CLINIC | Age: 59
Discharge: HOME | End: 2019-01-28
Attending: INTERNAL MEDICINE | Admitting: RADIOLOGY

## 2019-01-01 ENCOUNTER — HOSPITAL ENCOUNTER (OUTPATIENT)
Dept: ULTRASOUND IMAGING | Facility: CLINIC | Age: 59
Discharge: HOME | End: 2019-01-02
Attending: INTERNAL MEDICINE | Admitting: RADIOLOGY

## 2019-01-01 ENCOUNTER — RECORDS - HEALTHEAST (OUTPATIENT)
Dept: LAB | Facility: CLINIC | Age: 59
End: 2019-01-01

## 2019-01-01 ENCOUNTER — HOSPITAL ENCOUNTER (OUTPATIENT)
Dept: ULTRASOUND IMAGING | Facility: CLINIC | Age: 59
Discharge: HOME | End: 2019-01-15
Attending: INTERNAL MEDICINE | Admitting: RADIOLOGY

## 2019-01-01 ENCOUNTER — AMBULATORY - HEALTHEAST (OUTPATIENT)
Dept: SCHEDULING | Facility: CLINIC | Age: 59
End: 2019-01-01

## 2019-01-01 ENCOUNTER — HOSPITAL ENCOUNTER (OUTPATIENT)
Dept: ULTRASOUND IMAGING | Facility: CLINIC | Age: 59
Discharge: HOME | End: 2019-04-08
Attending: INTERNAL MEDICINE | Admitting: RADIOLOGY

## 2019-01-01 ENCOUNTER — HOSPITAL ENCOUNTER (OUTPATIENT)
Dept: ULTRASOUND IMAGING | Facility: CLINIC | Age: 59
Discharge: HOME | End: 2019-01-29
Attending: INTERNAL MEDICINE

## 2019-01-01 ENCOUNTER — HOSPITAL ENCOUNTER (OUTPATIENT)
Dept: ULTRASOUND IMAGING | Facility: CLINIC | Age: 59
Discharge: HOME | End: 2019-01-08
Attending: INTERNAL MEDICINE | Admitting: RADIOLOGY

## 2019-01-01 ENCOUNTER — HOSPITAL ENCOUNTER (OUTPATIENT)
Dept: ULTRASOUND IMAGING | Facility: CLINIC | Age: 59
Discharge: HOME | End: 2019-05-06
Attending: INTERNAL MEDICINE | Admitting: RADIOLOGY

## 2019-01-01 ENCOUNTER — HOSPITAL ENCOUNTER (OUTPATIENT)
Dept: ULTRASOUND IMAGING | Facility: CLINIC | Age: 59
Discharge: HOME | End: 2019-01-22
Attending: INTERNAL MEDICINE | Admitting: RADIOLOGY

## 2019-01-01 ENCOUNTER — HOSPITAL ENCOUNTER (OUTPATIENT)
Dept: ULTRASOUND IMAGING | Facility: CLINIC | Age: 59
Discharge: HOME | End: 2019-04-01
Attending: INTERNAL MEDICINE | Admitting: RADIOLOGY

## 2019-01-01 ENCOUNTER — HOSPITAL ENCOUNTER (OUTPATIENT)
Dept: ULTRASOUND IMAGING | Facility: CLINIC | Age: 59
Discharge: HOME | End: 2019-02-18
Attending: INTERNAL MEDICINE | Admitting: RADIOLOGY

## 2019-01-01 ENCOUNTER — HOSPITAL ENCOUNTER (OUTPATIENT)
Dept: ULTRASOUND IMAGING | Facility: CLINIC | Age: 59
Discharge: HOME | End: 2019-03-25
Attending: INTERNAL MEDICINE | Admitting: RADIOLOGY

## 2019-01-01 ENCOUNTER — HOSPITAL ENCOUNTER (OUTPATIENT)
Dept: ULTRASOUND IMAGING | Facility: CLINIC | Age: 59
Discharge: HOME | End: 2019-02-25
Attending: INTERNAL MEDICINE | Admitting: RADIOLOGY

## 2019-01-01 ENCOUNTER — HOSPITAL ENCOUNTER (OUTPATIENT)
Dept: ULTRASOUND IMAGING | Facility: CLINIC | Age: 59
Discharge: HOME | End: 2019-02-04
Attending: INTERNAL MEDICINE | Admitting: RADIOLOGY

## 2019-01-01 ENCOUNTER — HOSPITAL ENCOUNTER (OUTPATIENT)
Dept: ULTRASOUND IMAGING | Facility: CLINIC | Age: 59
Discharge: HOME | End: 2019-02-11
Attending: INTERNAL MEDICINE | Admitting: RADIOLOGY

## 2019-01-01 ENCOUNTER — HOSPITAL ENCOUNTER (OUTPATIENT)
Dept: ULTRASOUND IMAGING | Facility: CLINIC | Age: 59
Discharge: HOME | End: 2019-03-04
Attending: INTERNAL MEDICINE | Admitting: RADIOLOGY

## 2019-01-01 ENCOUNTER — HOSPITAL ENCOUNTER (OUTPATIENT)
Dept: ULTRASOUND IMAGING | Facility: CLINIC | Age: 59
Discharge: HOME | End: 2019-03-18
Attending: INTERNAL MEDICINE | Admitting: RADIOLOGY

## 2019-01-01 ENCOUNTER — APPOINTMENT (OUTPATIENT)
Dept: ULTRASOUND IMAGING | Facility: CLINIC | Age: 59
DRG: 432 | End: 2019-01-01
Attending: PHYSICIAN ASSISTANT
Payer: MEDICARE

## 2019-01-01 ENCOUNTER — HOSPITAL ENCOUNTER (OUTPATIENT)
Dept: ULTRASOUND IMAGING | Facility: CLINIC | Age: 59
Discharge: HOME | End: 2019-03-11
Attending: INTERNAL MEDICINE | Admitting: RADIOLOGY

## 2019-01-01 ENCOUNTER — HOSPITAL ENCOUNTER (INPATIENT)
Facility: CLINIC | Age: 59
LOS: 2 days | DRG: 432 | End: 2019-05-16
Attending: INTERNAL MEDICINE | Admitting: INTERNAL MEDICINE
Payer: MEDICARE

## 2019-01-01 VITALS
WEIGHT: 153.4 LBS | SYSTOLIC BLOOD PRESSURE: 116 MMHG | DIASTOLIC BLOOD PRESSURE: 52 MMHG | HEART RATE: 101 BPM | TEMPERATURE: 97.2 F | BODY MASS INDEX: 24.08 KG/M2 | HEIGHT: 67 IN | RESPIRATION RATE: 16 BRPM | OXYGEN SATURATION: 95 %

## 2019-01-01 DIAGNOSIS — K70.31 ALCOHOLIC CIRRHOSIS OF LIVER WITH ASCITES (H): ICD-10-CM

## 2019-01-01 DIAGNOSIS — K70.31 ASCITES DUE TO ALCOHOLIC CIRRHOSIS (H): ICD-10-CM

## 2019-01-01 LAB
ABO + RH BLD: NORMAL
ABO + RH BLD: NORMAL
ALBUMIN SERPL-MCNC: 3.1 G/DL (ref 3.4–5)
ALBUMIN SERPL-MCNC: 3.3 G/DL (ref 3.4–5)
ALP SERPL-CCNC: 115 U/L (ref 40–150)
ALP SERPL-CCNC: 129 U/L (ref 40–150)
ALT SERPL W P-5'-P-CCNC: 42 U/L (ref 0–50)
ALT SERPL W P-5'-P-CCNC: 48 U/L (ref 0–50)
ALT SERPL-CCNC: 52 U/L (ref 0–45)
ANION GAP SERPL CALCULATED.3IONS-SCNC: 13 MMOL/L (ref 3–14)
ANION GAP SERPL CALCULATED.3IONS-SCNC: 14 MMOL/L (ref 3–14)
ANION GAP SERPL CALCULATED.3IONS-SCNC: 8 MMOL/L (ref 5–18)
AST SERPL W P-5'-P-CCNC: 162 U/L (ref 0–45)
AST SERPL W P-5'-P-CCNC: 167 U/L (ref 0–45)
AST SERPL-CCNC: 180 U/L (ref 0–40)
BILIRUB SERPL-MCNC: 25 MG/DL (ref 0.2–1.3)
BILIRUB SERPL-MCNC: 26.5 MG/DL (ref 0.2–1.3)
BLD GP AB SCN SERPL QL: NORMAL
BLOOD BANK CMNT PATIENT-IMP: NORMAL
BUN SERPL-MCNC: 31 MG/DL (ref 7–30)
BUN SERPL-MCNC: 34 MG/DL (ref 7–30)
BUN SERPL-MCNC: 5 MG/DL (ref 8–22)
CALCIUM SERPL-MCNC: 8.3 MG/DL (ref 8.5–10.5)
CALCIUM SERPL-MCNC: 9.8 MG/DL (ref 8.5–10.1)
CALCIUM SERPL-MCNC: 9.9 MG/DL (ref 8.5–10.1)
CHLORIDE BLD-SCNC: 104 MMOL/L (ref 98–107)
CHLORIDE SERPL-SCNC: 100 MMOL/L (ref 94–109)
CHLORIDE SERPL-SCNC: 95 MMOL/L (ref 94–109)
CO2 SERPL-SCNC: 20 MMOL/L (ref 20–32)
CO2 SERPL-SCNC: 21 MMOL/L (ref 20–32)
CO2 SERPL-SCNC: 29 MMOL/L (ref 22–31)
CREAT SERPL-MCNC: 0.71 MG/DL (ref 0.6–1.1)
CREAT SERPL-MCNC: 1.36 MG/DL (ref 0.52–1.04)
CREAT SERPL-MCNC: 1.36 MG/DL (ref 0.52–1.04)
CREAT SERPL-MCNC: 1.62 MG/DL (ref 0.6–1.1)
ERYTHROCYTE [DISTWIDTH] IN BLOOD BY AUTOMATED COUNT: 18.2 % (ref 10–15)
ERYTHROCYTE [DISTWIDTH] IN BLOOD BY AUTOMATED COUNT: 18.6 % (ref 10–15)
FERRITIN SERPL-MCNC: 192 NG/ML (ref 10–130)
FERRITIN SERPL-MCNC: NORMAL NG/ML (ref 8–252)
FIBRINOGEN PPP-MCNC: 192 MG/DL (ref 200–420)
FOLATE SERPL-MCNC: 23.5 NG/ML
FOLATE SERPL-MCNC: >20 NG/ML
GFR SERPL CREATININE-BSD FRML MDRD: 33 ML/MIN/1.73M2
GFR SERPL CREATININE-BSD FRML MDRD: 42 ML/MIN/{1.73_M2}
GFR SERPL CREATININE-BSD FRML MDRD: 42 ML/MIN/{1.73_M2}
GFR SERPL CREATININE-BSD FRML MDRD: >60 ML/MIN/1.73M2
GLUCOSE BLD-MCNC: 129 MG/DL (ref 70–125)
GLUCOSE BLDC GLUCOMTR-MCNC: 136 MG/DL (ref 70–99)
GLUCOSE BLDC GLUCOMTR-MCNC: 144 MG/DL (ref 70–99)
GLUCOSE BLDC GLUCOMTR-MCNC: 68 MG/DL (ref 70–99)
GLUCOSE SERPL-MCNC: 146 MG/DL (ref 70–99)
GLUCOSE SERPL-MCNC: 55 MG/DL (ref 70–99)
GLUCOSE SERPL-MCNC: 82 MG/DL (ref 70–125)
HCT VFR BLD AUTO: 24.1 % (ref 35–47)
HCT VFR BLD AUTO: 27.4 % (ref 35–47)
HGB BLD-MCNC: 7.9 G/DL (ref 11.7–15.7)
HGB BLD-MCNC: 8.7 G/DL (ref 11.7–15.7)
INR PPP: 1.53 (ref 0.9–1.1)
INR PPP: 1.78 (ref 0.9–1.1)
INR PPP: 2.26 (ref 0.86–1.14)
INR PPP: 2.43 (ref 0.86–1.14)
IRON SATN MFR SERPL: 94 % (ref 20–50)
IRON SATN MFR SERPL: 99 % (ref 15–46)
IRON SERPL-MCNC: 108 UG/DL (ref 35–180)
IRON SERPL-MCNC: 134 UG/DL (ref 42–175)
IRON SERPL-MCNC: 134 UG/DL (ref 42–175)
LACTATE BLD-SCNC: 2 MMOL/L (ref 0.7–2)
MAGNESIUM SERPL-MCNC: 1.2 MG/DL (ref 1.8–2.6)
MAGNESIUM SERPL-MCNC: 1.3 MG/DL (ref 1.8–2.6)
MAGNESIUM SERPL-MCNC: 1.3 MG/DL (ref 1.8–2.6)
MAGNESIUM SERPL-MCNC: 1.7 MG/DL (ref 1.8–2.6)
MAGNESIUM SERPL-MCNC: 2.6 MG/DL (ref 1.6–2.3)
MCH RBC QN AUTO: 39.9 PG (ref 26.5–33)
MCH RBC QN AUTO: 39.9 PG (ref 26.5–33)
MCHC RBC AUTO-ENTMCNC: 31.8 G/DL (ref 31.5–36.5)
MCHC RBC AUTO-ENTMCNC: 32.8 G/DL (ref 31.5–36.5)
MCV RBC AUTO: 122 FL (ref 78–100)
MCV RBC AUTO: 126 FL (ref 78–100)
PHOSPHATE SERPL-MCNC: 3.1 MG/DL (ref 2.5–4.5)
PLATELET # BLD AUTO: 51 10E9/L (ref 150–450)
PLATELET # BLD AUTO: 51 10E9/L (ref 150–450)
POTASSIUM BLD-SCNC: 3.2 MMOL/L (ref 3.5–5)
POTASSIUM BLD-SCNC: 3.4 MMOL/L (ref 3.5–5)
POTASSIUM BLD-SCNC: 3.6 MMOL/L (ref 3.5–5)
POTASSIUM BLD-SCNC: 3.7 MMOL/L (ref 3.5–5)
POTASSIUM BLD-SCNC: 4.5 MMOL/L (ref 3.5–5)
POTASSIUM SERPL-SCNC: 3 MMOL/L (ref 3.4–5.3)
POTASSIUM SERPL-SCNC: 3.3 MMOL/L (ref 3.5–5)
POTASSIUM SERPL-SCNC: 3.4 MMOL/L (ref 3.4–5.3)
POTASSIUM SERPL-SCNC: 3.7 MMOL/L (ref 3.4–5.3)
PROT SERPL-MCNC: 6.3 G/DL (ref 6.8–8.8)
PROT SERPL-MCNC: 6.6 G/DL (ref 6.8–8.8)
RBC # BLD AUTO: 1.98 10E12/L (ref 3.8–5.2)
RBC # BLD AUTO: 2.18 10E12/L (ref 3.8–5.2)
SODIUM SERPL-SCNC: 129 MMOL/L (ref 133–144)
SODIUM SERPL-SCNC: 135 MMOL/L (ref 133–144)
SODIUM SERPL-SCNC: 141 MMOL/L (ref 136–145)
SPECIMEN EXP DATE BLD: NORMAL
TIBC SERPL-MCNC: 109 UG/DL (ref 240–430)
TIBC SERPL-MCNC: 143 UG/DL (ref 313–563)
TRANSFERRIN SERPL-MCNC: 114 MG/DL (ref 212–360)
VIT B12 SERPL-MCNC: 1807 PG/ML (ref 193–986)
VIT B12 SERPL-MCNC: 849 PG/ML (ref 213–816)
WBC # BLD AUTO: 2.5 10E9/L (ref 4–11)
WBC # BLD AUTO: 4 10E9/L (ref 4–11)

## 2019-01-01 PROCEDURE — 99233 SBSQ HOSP IP/OBS HIGH 50: CPT | Performed by: INTERNAL MEDICINE

## 2019-01-01 PROCEDURE — 25000125 ZZHC RX 250: Performed by: INTERNAL MEDICINE

## 2019-01-01 PROCEDURE — 25000128 H RX IP 250 OP 636: Performed by: PHYSICIAN ASSISTANT

## 2019-01-01 PROCEDURE — 12000001 ZZH R&B MED SURG/OB UMMC

## 2019-01-01 PROCEDURE — 85610 PROTHROMBIN TIME: CPT | Performed by: PHYSICIAN ASSISTANT

## 2019-01-01 PROCEDURE — A9270 NON-COVERED ITEM OR SERVICE: HCPCS | Performed by: PHYSICIAN ASSISTANT

## 2019-01-01 PROCEDURE — 82607 VITAMIN B-12: CPT | Performed by: PHYSICIAN ASSISTANT

## 2019-01-01 PROCEDURE — 25800030 ZZH RX IP 258 OP 636: Performed by: PHYSICIAN ASSISTANT

## 2019-01-01 PROCEDURE — 84100 ASSAY OF PHOSPHORUS: CPT | Performed by: PHYSICIAN ASSISTANT

## 2019-01-01 PROCEDURE — 80320 DRUG SCREEN QUANTALCOHOLS: CPT | Performed by: PHYSICIAN ASSISTANT

## 2019-01-01 PROCEDURE — P9047 ALBUMIN (HUMAN), 25%, 50ML: HCPCS | Performed by: PHYSICIAN ASSISTANT

## 2019-01-01 PROCEDURE — 80053 COMPREHEN METABOLIC PANEL: CPT | Performed by: PHYSICIAN ASSISTANT

## 2019-01-01 PROCEDURE — 36415 COLL VENOUS BLD VENIPUNCTURE: CPT | Performed by: STUDENT IN AN ORGANIZED HEALTH CARE EDUCATION/TRAINING PROGRAM

## 2019-01-01 PROCEDURE — 99239 HOSP IP/OBS DSCHRG MGMT >30: CPT | Performed by: INTERNAL MEDICINE

## 2019-01-01 PROCEDURE — 76705 ECHO EXAM OF ABDOMEN: CPT

## 2019-01-01 PROCEDURE — 85027 COMPLETE CBC AUTOMATED: CPT | Performed by: PHYSICIAN ASSISTANT

## 2019-01-01 PROCEDURE — 93010 ELECTROCARDIOGRAM REPORT: CPT | Performed by: INTERNAL MEDICINE

## 2019-01-01 PROCEDURE — 00000146 ZZHCL STATISTIC GLUCOSE BY METER IP

## 2019-01-01 PROCEDURE — 40000275 ZZH STATISTIC RCP TIME EA 10 MIN

## 2019-01-01 PROCEDURE — 31720 CLEARANCE OF AIRWAYS: CPT

## 2019-01-01 PROCEDURE — 83550 IRON BINDING TEST: CPT | Performed by: PHYSICIAN ASSISTANT

## 2019-01-01 PROCEDURE — 83605 ASSAY OF LACTIC ACID: CPT

## 2019-01-01 PROCEDURE — C9113 INJ PANTOPRAZOLE SODIUM, VIA: HCPCS | Performed by: PHYSICIAN ASSISTANT

## 2019-01-01 PROCEDURE — 40000141 ZZH STATISTIC PERIPHERAL IV START W/O US GUIDANCE

## 2019-01-01 PROCEDURE — 99223 1ST HOSP IP/OBS HIGH 75: CPT | Mod: AI | Performed by: INTERNAL MEDICINE

## 2019-01-01 PROCEDURE — 86850 RBC ANTIBODY SCREEN: CPT | Performed by: PHYSICIAN ASSISTANT

## 2019-01-01 PROCEDURE — 82746 ASSAY OF FOLIC ACID SERUM: CPT | Performed by: PHYSICIAN ASSISTANT

## 2019-01-01 PROCEDURE — 36415 COLL VENOUS BLD VENIPUNCTURE: CPT | Performed by: PHYSICIAN ASSISTANT

## 2019-01-01 PROCEDURE — 25800025 ZZH RX 258: Performed by: PHYSICIAN ASSISTANT

## 2019-01-01 PROCEDURE — 25000132 ZZH RX MED GY IP 250 OP 250 PS 637: Performed by: PHYSICIAN ASSISTANT

## 2019-01-01 PROCEDURE — 99207 ZZC APP CREDIT; MD BILLING SHARED VISIT: CPT | Performed by: PHYSICIAN ASSISTANT

## 2019-01-01 PROCEDURE — 84132 ASSAY OF SERUM POTASSIUM: CPT | Performed by: STUDENT IN AN ORGANIZED HEALTH CARE EDUCATION/TRAINING PROGRAM

## 2019-01-01 PROCEDURE — 83540 ASSAY OF IRON: CPT | Performed by: PHYSICIAN ASSISTANT

## 2019-01-01 PROCEDURE — 83735 ASSAY OF MAGNESIUM: CPT | Performed by: PHYSICIAN ASSISTANT

## 2019-01-01 PROCEDURE — 86901 BLOOD TYPING SEROLOGIC RH(D): CPT | Performed by: PHYSICIAN ASSISTANT

## 2019-01-01 PROCEDURE — 85384 FIBRINOGEN ACTIVITY: CPT | Performed by: PHYSICIAN ASSISTANT

## 2019-01-01 PROCEDURE — 86900 BLOOD TYPING SEROLOGIC ABO: CPT | Performed by: PHYSICIAN ASSISTANT

## 2019-01-01 RX ORDER — CEFTRIAXONE 2 G/1
2 INJECTION, POWDER, FOR SOLUTION INTRAMUSCULAR; INTRAVENOUS EVERY 24 HOURS
Status: DISCONTINUED | OUTPATIENT
Start: 2019-01-01 | End: 2019-01-01

## 2019-01-01 RX ORDER — ACETAMINOPHEN 325 MG/1
650 TABLET ORAL EVERY 4 HOURS PRN
COMMUNITY
Start: 2018-01-01

## 2019-01-01 RX ORDER — LACTULOSE 10 G/15ML
20 SOLUTION ORAL
Status: DISCONTINUED | OUTPATIENT
Start: 2019-01-01 | End: 2019-01-01

## 2019-01-01 RX ORDER — FUROSEMIDE 80 MG
80 TABLET ORAL DAILY
COMMUNITY
Start: 2018-01-01

## 2019-01-01 RX ORDER — POTASSIUM CHLORIDE 7.45 MG/ML
10 INJECTION INTRAVENOUS
Status: DISCONTINUED | OUTPATIENT
Start: 2019-01-01 | End: 2019-01-01

## 2019-01-01 RX ORDER — ACETAMINOPHEN 325 MG/1
650 TABLET ORAL EVERY 4 HOURS PRN
Status: DISCONTINUED | OUTPATIENT
Start: 2019-01-01 | End: 2019-01-01 | Stop reason: HOSPADM

## 2019-01-01 RX ORDER — POTASSIUM CHLORIDE 750 MG/1
20-40 TABLET, EXTENDED RELEASE ORAL
Status: DISCONTINUED | OUTPATIENT
Start: 2019-01-01 | End: 2019-01-01

## 2019-01-01 RX ORDER — ATROPINE SULFATE 10 MG/ML
1-2 SOLUTION/ DROPS OPHTHALMIC
Status: DISCONTINUED | OUTPATIENT
Start: 2019-01-01 | End: 2019-01-01 | Stop reason: HOSPADM

## 2019-01-01 RX ORDER — LACTULOSE 10 G/15ML
100 SOLUTION ORAL
Status: DISCONTINUED | OUTPATIENT
Start: 2019-01-01 | End: 2019-01-01

## 2019-01-01 RX ORDER — OXYCODONE HYDROCHLORIDE 5 MG/1
5 TABLET ORAL EVERY 12 HOURS PRN
COMMUNITY
Start: 2011-05-10

## 2019-01-01 RX ORDER — FOLIC ACID 1 MG/1
1 TABLET ORAL DAILY
COMMUNITY
Start: 2018-01-01

## 2019-01-01 RX ORDER — NALOXONE HYDROCHLORIDE 0.4 MG/ML
.1-.4 INJECTION, SOLUTION INTRAMUSCULAR; INTRAVENOUS; SUBCUTANEOUS
Status: DISCONTINUED | OUTPATIENT
Start: 2019-01-01 | End: 2019-01-01 | Stop reason: HOSPADM

## 2019-01-01 RX ORDER — FOLIC ACID 1 MG/1
1 TABLET ORAL DAILY
Status: DISCONTINUED | OUTPATIENT
Start: 2019-01-01 | End: 2019-01-01

## 2019-01-01 RX ORDER — TRAZODONE HYDROCHLORIDE 50 MG/1
25 TABLET, FILM COATED ORAL AT BEDTIME
COMMUNITY
Start: 2018-01-01

## 2019-01-01 RX ORDER — GABAPENTIN 100 MG/1
200 CAPSULE ORAL 2 TIMES DAILY
COMMUNITY
Start: 2018-01-01

## 2019-01-01 RX ORDER — HYDROMORPHONE HYDROCHLORIDE 1 MG/ML
.3-.5 INJECTION, SOLUTION INTRAMUSCULAR; INTRAVENOUS; SUBCUTANEOUS
Status: DISCONTINUED | OUTPATIENT
Start: 2019-01-01 | End: 2019-01-01

## 2019-01-01 RX ORDER — POTASSIUM CHLORIDE 1.5 G/1.58G
20-40 POWDER, FOR SOLUTION ORAL
Status: DISCONTINUED | OUTPATIENT
Start: 2019-01-01 | End: 2019-01-01

## 2019-01-01 RX ORDER — HYDROMORPHONE HYDROCHLORIDE 1 MG/ML
.3-.5 INJECTION, SOLUTION INTRAMUSCULAR; INTRAVENOUS; SUBCUTANEOUS
Status: DISCONTINUED | OUTPATIENT
Start: 2019-01-01 | End: 2019-01-01 | Stop reason: HOSPADM

## 2019-01-01 RX ORDER — LACTULOSE 10 G/15ML
25 SOLUTION ORAL 2 TIMES DAILY
Status: DISCONTINUED | OUTPATIENT
Start: 2019-01-01 | End: 2019-01-01

## 2019-01-01 RX ORDER — GLYCOPYRROLATE 0.2 MG/ML
.1-.2 INJECTION, SOLUTION INTRAMUSCULAR; INTRAVENOUS EVERY 4 HOURS PRN
Status: DISCONTINUED | OUTPATIENT
Start: 2019-01-01 | End: 2019-01-01 | Stop reason: HOSPADM

## 2019-01-01 RX ORDER — ONDANSETRON 4 MG/1
4 TABLET, ORALLY DISINTEGRATING ORAL EVERY 6 HOURS PRN
Status: DISCONTINUED | OUTPATIENT
Start: 2019-01-01 | End: 2019-01-01

## 2019-01-01 RX ORDER — ONDANSETRON 2 MG/ML
4 INJECTION INTRAMUSCULAR; INTRAVENOUS EVERY 6 HOURS PRN
Status: DISCONTINUED | OUTPATIENT
Start: 2019-01-01 | End: 2019-01-01

## 2019-01-01 RX ORDER — HYDROMORPHONE HYDROCHLORIDE 1 MG/ML
2-3 SOLUTION ORAL
Status: DISCONTINUED | OUTPATIENT
Start: 2019-01-01 | End: 2019-01-01 | Stop reason: HOSPADM

## 2019-01-01 RX ORDER — LACTULOSE 10 G/15ML
16.67 SOLUTION ORAL 2 TIMES DAILY
COMMUNITY
Start: 2018-01-01

## 2019-01-01 RX ORDER — LANOLIN ALCOHOL/MO/W.PET/CERES
100 CREAM (GRAM) TOPICAL DAILY
COMMUNITY
Start: 2018-01-01

## 2019-01-01 RX ORDER — ESCITALOPRAM OXALATE 10 MG/1
10 TABLET ORAL DAILY
COMMUNITY
Start: 2018-01-01

## 2019-01-01 RX ORDER — PROCHLORPERAZINE MALEATE 5 MG
5-10 TABLET ORAL EVERY 6 HOURS PRN
Status: DISCONTINUED | OUTPATIENT
Start: 2019-01-01 | End: 2019-01-01

## 2019-01-01 RX ORDER — ONDANSETRON 2 MG/ML
4 INJECTION INTRAMUSCULAR; INTRAVENOUS EVERY 6 HOURS PRN
Status: DISCONTINUED | OUTPATIENT
Start: 2019-01-01 | End: 2019-01-01 | Stop reason: HOSPADM

## 2019-01-01 RX ORDER — POTASSIUM CHLORIDE 29.8 MG/ML
20 INJECTION INTRAVENOUS
Status: DISCONTINUED | OUTPATIENT
Start: 2019-01-01 | End: 2019-01-01

## 2019-01-01 RX ORDER — DEXTROSE MONOHYDRATE 25 G/50ML
25-50 INJECTION, SOLUTION INTRAVENOUS
Status: DISCONTINUED | OUTPATIENT
Start: 2019-01-01 | End: 2019-01-01 | Stop reason: HOSPADM

## 2019-01-01 RX ORDER — IBUPROFEN 200 MG
400 TABLET ORAL 2 TIMES DAILY PRN
COMMUNITY

## 2019-01-01 RX ORDER — FERROUS SULFATE 325(65) MG
1 TABLET ORAL 2 TIMES DAILY
COMMUNITY
Start: 2018-01-01

## 2019-01-01 RX ORDER — LIDOCAINE 4 G/G
1 PATCH TOPICAL DAILY
COMMUNITY

## 2019-01-01 RX ORDER — MIRTAZAPINE 45 MG/1
22.5 TABLET, FILM COATED ORAL AT BEDTIME
COMMUNITY
Start: 2018-01-01

## 2019-01-01 RX ORDER — POLYETHYLENE GLYCOL 3350 17 G/17G
17 POWDER, FOR SOLUTION ORAL DAILY PRN
Status: DISCONTINUED | OUTPATIENT
Start: 2019-01-01 | End: 2019-01-01

## 2019-01-01 RX ORDER — NALOXONE HYDROCHLORIDE 0.4 MG/ML
.1-.4 INJECTION, SOLUTION INTRAMUSCULAR; INTRAVENOUS; SUBCUTANEOUS
Status: DISCONTINUED | OUTPATIENT
Start: 2019-01-01 | End: 2019-01-01

## 2019-01-01 RX ORDER — FOLIC ACID 5 MG/ML
1 INJECTION, SOLUTION INTRAMUSCULAR; INTRAVENOUS; SUBCUTANEOUS DAILY
Status: DISCONTINUED | OUTPATIENT
Start: 2019-01-01 | End: 2019-01-01

## 2019-01-01 RX ORDER — TROLAMINE SALICYLATE 10 G/100G
1 CREAM TOPICAL PRN
COMMUNITY

## 2019-01-01 RX ORDER — LIDOCAINE 40 MG/G
CREAM TOPICAL
Status: DISCONTINUED | OUTPATIENT
Start: 2019-01-01 | End: 2019-01-01

## 2019-01-01 RX ORDER — NICOTINE POLACRILEX 4 MG
15-30 LOZENGE BUCCAL
Status: DISCONTINUED | OUTPATIENT
Start: 2019-01-01 | End: 2019-01-01 | Stop reason: HOSPADM

## 2019-01-01 RX ORDER — SPIRONOLACTONE 50 MG/1
50 TABLET, FILM COATED ORAL DAILY
COMMUNITY

## 2019-01-01 RX ORDER — MAGNESIUM L-LACTATE 84 MG
2-3 TABLET, EXTENDED RELEASE ORAL
COMMUNITY
Start: 2018-01-01

## 2019-01-01 RX ORDER — HYDROMORPHONE HYDROCHLORIDE 1 MG/ML
1 SOLUTION ORAL
Status: DISCONTINUED | OUTPATIENT
Start: 2019-01-01 | End: 2019-01-01

## 2019-01-01 RX ORDER — ALBUMIN (HUMAN) 12.5 G/50ML
100 SOLUTION INTRAVENOUS DAILY
Status: DISCONTINUED | OUTPATIENT
Start: 2019-01-01 | End: 2019-01-01

## 2019-01-01 RX ORDER — ONDANSETRON 4 MG/1
4 TABLET, ORALLY DISINTEGRATING ORAL EVERY 6 HOURS PRN
Status: DISCONTINUED | OUTPATIENT
Start: 2019-01-01 | End: 2019-01-01 | Stop reason: HOSPADM

## 2019-01-01 RX ORDER — THIAMINE HYDROCHLORIDE 100 MG/ML
100 INJECTION, SOLUTION INTRAMUSCULAR; INTRAVENOUS DAILY
Status: DISCONTINUED | OUTPATIENT
Start: 2019-01-01 | End: 2019-01-01

## 2019-01-01 RX ORDER — POTASSIUM CL/LIDO/0.9 % NACL 10MEQ/0.1L
10 INTRAVENOUS SOLUTION, PIGGYBACK (ML) INTRAVENOUS
Status: DISCONTINUED | OUTPATIENT
Start: 2019-01-01 | End: 2019-01-01

## 2019-01-01 RX ORDER — LANOLIN ALCOHOL/MO/W.PET/CERES
100 CREAM (GRAM) TOPICAL DAILY
Status: DISCONTINUED | OUTPATIENT
Start: 2019-01-01 | End: 2019-01-01

## 2019-01-01 RX ORDER — LORAZEPAM 2 MG/ML
0.5 INJECTION INTRAMUSCULAR EVERY 4 HOURS PRN
Status: DISCONTINUED | OUTPATIENT
Start: 2019-01-01 | End: 2019-01-01 | Stop reason: HOSPADM

## 2019-01-01 RX ADMIN — LACTULOSE 25 ML: 20 SOLUTION ORAL at 21:35

## 2019-01-01 RX ADMIN — ONDANSETRON 4 MG: 2 INJECTION INTRAMUSCULAR; INTRAVENOUS at 21:51

## 2019-01-01 RX ADMIN — PANTOPRAZOLE SODIUM 40 MG: 40 INJECTION, POWDER, LYOPHILIZED, FOR SOLUTION INTRAVENOUS at 00:10

## 2019-01-01 RX ADMIN — LACTULOSE 20 G: 20 SOLUTION ORAL at 02:12

## 2019-01-01 RX ADMIN — LORAZEPAM 0.5 MG: 2 INJECTION INTRAMUSCULAR; INTRAVENOUS at 03:30

## 2019-01-01 RX ADMIN — Medication 3 MG: at 22:41

## 2019-01-01 RX ADMIN — THIAMINE HYDROCHLORIDE 500 MG: 100 INJECTION, SOLUTION INTRAMUSCULAR; INTRAVENOUS at 22:50

## 2019-01-01 RX ADMIN — GLYCOPYRROLATE 0.2 MG: 0.2 INJECTION, SOLUTION INTRAMUSCULAR; INTRAVENOUS at 21:30

## 2019-01-01 RX ADMIN — Medication 10 MEQ: at 22:45

## 2019-01-01 RX ADMIN — GLYCOPYRROLATE 0.2 MG: 0.2 INJECTION, SOLUTION INTRAMUSCULAR; INTRAVENOUS at 17:26

## 2019-01-01 RX ADMIN — DEXTROSE MONOHYDRATE 25 ML: 25 INJECTION, SOLUTION INTRAVENOUS at 00:06

## 2019-01-01 RX ADMIN — Medication 1 MG: at 17:25

## 2019-01-01 RX ADMIN — ATROPINE SULFATE 2 DROP: 10 SOLUTION/ DROPS OPHTHALMIC at 14:08

## 2019-01-01 RX ADMIN — Medication 0.5 MG: at 00:30

## 2019-01-01 RX ADMIN — ATROPINE SULFATE 2 DROP: 10 SOLUTION/ DROPS OPHTHALMIC at 21:31

## 2019-01-01 RX ADMIN — Medication 3 MG: at 20:11

## 2019-01-01 RX ADMIN — Medication 2 MG: at 18:09

## 2019-01-01 RX ADMIN — ATROPINE SULFATE 2 DROP: 10 SOLUTION/ DROPS OPHTHALMIC at 16:15

## 2019-01-01 RX ADMIN — PANTOPRAZOLE SODIUM 40 MG: 40 INJECTION, POWDER, LYOPHILIZED, FOR SOLUTION INTRAVENOUS at 08:21

## 2019-01-01 RX ADMIN — CEFTRIAXONE SODIUM 2 G: 2 INJECTION, POWDER, FOR SOLUTION INTRAMUSCULAR; INTRAVENOUS at 00:11

## 2019-01-01 RX ADMIN — Medication 10 MEQ: at 02:41

## 2019-01-01 RX ADMIN — Medication 10 MEQ: at 01:28

## 2019-01-01 RX ADMIN — Medication 0.5 MG: at 03:30

## 2019-01-01 RX ADMIN — ATROPINE SULFATE 2 DROP: 10 SOLUTION/ DROPS OPHTHALMIC at 15:38

## 2019-01-01 RX ADMIN — ATROPINE SULFATE 2 DROP: 10 SOLUTION/ DROPS OPHTHALMIC at 13:22

## 2019-01-01 RX ADMIN — ALBUMIN HUMAN 100 G: 0.25 SOLUTION INTRAVENOUS at 08:09

## 2019-01-01 RX ADMIN — GLYCOPYRROLATE 0.2 MG: 0.2 INJECTION, SOLUTION INTRAMUSCULAR; INTRAVENOUS at 13:15

## 2019-01-01 RX ADMIN — Medication 10 MEQ: at 00:01

## 2019-01-01 RX ADMIN — ATROPINE SULFATE 2 DROP: 10 SOLUTION/ DROPS OPHTHALMIC at 18:12

## 2019-01-01 RX ADMIN — THIAMINE HYDROCHLORIDE 500 MG: 100 INJECTION, SOLUTION INTRAMUSCULAR; INTRAVENOUS at 06:26

## 2019-01-01 RX ADMIN — LORAZEPAM 0.5 MG: 2 INJECTION INTRAMUSCULAR; INTRAVENOUS at 22:02

## 2019-01-01 RX ADMIN — GLYCOPYRROLATE 0.2 MG: 0.2 INJECTION, SOLUTION INTRAMUSCULAR; INTRAVENOUS at 07:45

## 2019-01-01 RX ADMIN — ATROPINE SULFATE 2 DROP: 10 SOLUTION/ DROPS OPHTHALMIC at 20:11

## 2019-01-01 RX ADMIN — ATROPINE SULFATE 1 DROP: 10 SOLUTION/ DROPS OPHTHALMIC at 00:18

## 2019-01-01 RX ADMIN — LACTULOSE 20 G: 20 SOLUTION ORAL at 00:08

## 2019-01-01 RX ADMIN — LORAZEPAM 0.5 MG: 2 INJECTION INTRAMUSCULAR; INTRAVENOUS at 00:31

## 2019-01-01 RX ADMIN — ATROPINE SULFATE 2 DROP: 10 SOLUTION/ DROPS OPHTHALMIC at 17:27

## 2019-01-01 RX ADMIN — Medication 0.5 MG: at 07:45

## 2019-01-01 RX ADMIN — Medication 0.5 MG: at 05:53

## 2019-01-01 RX ADMIN — LACTULOSE 20 G: 20 SOLUTION ORAL at 04:14

## 2019-01-01 ASSESSMENT — ACTIVITIES OF DAILY LIVING (ADL)
DRESS: 0-->INDEPENDENT
ADLS_ACUITY_SCORE: 21
WHICH_OF_THE_ABOVE_FUNCTIONAL_RISKS_HAD_A_RECENT_ONSET_OR_CHANGE?: AMBULATION;TRANSFERRING;TOILETING;BATHING;DRESSING;COGNITION
ADLS_ACUITY_SCORE: 21
AMBULATION: 0-->INDEPENDENT
NUMBER_OF_TIMES_PATIENT_HAS_FALLEN_WITHIN_LAST_SIX_MONTHS: 3
ADLS_ACUITY_SCORE: 21
ADLS_ACUITY_SCORE: 21
TOILETING: 0-->INDEPENDENT
ADLS_ACUITY_SCORE: 21
ADLS_ACUITY_SCORE: 21
RETIRED_COMMUNICATION: 0-->UNDERSTANDS/COMMUNICATES WITHOUT DIFFICULTY
ADLS_ACUITY_SCORE: 21
COGNITION: 0 - NO COGNITION ISSUES REPORTED
SWALLOWING: 0-->SWALLOWS FOODS/LIQUIDS WITHOUT DIFFICULTY
FALL_HISTORY_WITHIN_LAST_SIX_MONTHS: YES
BATHING: 0-->INDEPENDENT
ADLS_ACUITY_SCORE: 23
RETIRED_EATING: 0-->INDEPENDENT
ADLS_ACUITY_SCORE: 21
TRANSFERRING: 0-->INDEPENDENT

## 2019-01-01 ASSESSMENT — MIFFLIN-ST. JEOR: SCORE: 1303.45

## 2019-01-10 NOTE — TELEPHONE ENCOUNTER
Spoke with Herminia who is not wanting an evaluation or to be seen here at this time. She agrees to contact us at any time if she decides she wants a consult or evaluation. Communicated to referring evaluation closed.

## 2019-05-14 PROBLEM — K70.30 ALCOHOLIC CIRRHOSIS (H): Status: ACTIVE | Noted: 2019-01-01

## 2019-05-14 NOTE — PROGRESS NOTES
[facility]  Transfer Triage Note    Date of call: 05/14/19  Time of call: 2:15 PM    Reason for Transfer:Worsening liver disease; access to specialists  Diagnosis: ESLD    Outside Records: Not available  Additional records requested to be faxed to [phone].    Stability of Patient: Patient is vitally stable, with no critical labs, and will likely remain stable throughout the transfer process    Expected Time of Arrival for Transfer: 0-8 hours    Recommendations for Management and Stabilization: Given    Additional Comments   Patient w/liver disease, last drink within the last month, presented to [facility] with increased confusion and worsening LFTS. No obvious infection. Mentally clear in their ED, vitals stable.  No bleeding. NH3 up but clear. Some question about med compliance. Being transferred here to medically optimize (all aware patient not a candidate for transplant workup).  -Blood cultures, CXR being done  -IV albumin being given for mild bump in Cr    Anna Lucille Abad MD

## 2019-05-15 NOTE — PROGRESS NOTES
Consult and Procedure Service Note    Called to evaluate patient for paracentesis.   POCUS reveals limited ascites with small pockets  - given goals of care, will not perform.    Please contact our team with questions.    Lino Sherman MD  [phone]

## 2019-05-15 NOTE — PLAN OF CARE
"Time 2843-4167     Reason for admission: decompensated alcoholic cirrhosis  Vitals: VSS on RA, except soft BPs.    BP (!) 95/35 (BP Location: Right arm)   Pulse 99   Temp 96.3  F (35.7  C) (Axillary)   Resp 16   Ht 1.702 m (5' 7\")   Wt 69.6 kg (153 lb 6.4 oz)   SpO2 95%   BMI 24.03 kg/m      Activity: A2  Pain: Denies  Neuro: Alert, disoriented to time. Forgetful  Cardiac: WDL  Respiratory: WDL on RA  GI/: Incontinent bladder. No BM on shift.  Diet: NPO except meds  Lines: 2 PIVs  Labs: Na 129. K 3.0. Bili 25.0. .  New this shift: Pt admitted from OSH. Potassium replacement started, oncoming RN will continue to replace. BG 68 at 2339, team notified, hypoglycemic protocol ordered, oncoming RN will implement. Pt lethargic and withdrawn. Scheduled lactulose given. Discussed POC with daughter, per pt.      Continue to monitor and follow POC  "

## 2019-05-15 NOTE — PROGRESS NOTES
Social Work Services Progress Note    Hospital Day: 2  Collaborated with:  Pt's daughter Melissa, pt's daughter-in-law, primary team Gold 9, GI NP, RN    Data:  Pt is a 58-year-old female admitted to [facility] 5/14/19 with decompensated cirrhosis, now on comfort cares.    Intervention:  Pt's family transitioned pt to comfort cares this morning. Per discussion with GI and primary team, pt's family wondering about transferring pt closer to home, but medical team unsure if pt will be stable to transfer. Per discussion with RN as well, pt not stable for transfer at this time. Met with pt's daughter Melissa and daughter-and-law. Melissa states that pt lives in [facility] in [place] and that it would be nice for pt to be closer to family. Explained that if pt stable, could discharge home with hospice but would likely need 24/7 caregiver and hospice does not provide 24/7 hands-on assistance. It seems family unable to provide 24/7 caregiving. Briefly discussed options of SNF or residential hospice facility if pt becomes stable for discharge. Offered pt's family information on these options but they would rather wait until tomorrow to discuss further to see if pt becomes stable for discharge.    Assessment:  Pt on comfort cares; currently not stable for discharge    Plan:    Anticipated Disposition:  TBD    Barriers to d/c plan:  Medical stability    Follow Up:  SW to follow and assist as needed    LORI Mo, [facility]  5th Floor   Pager [phone]  Phone [phone]

## 2019-05-15 NOTE — CONSULTS
Hepatology Consultation    Herminia Dewitt   MRN# [id number]     Age: 58 year old YOB: 1960       Referring provider: Lorenzo Sung  Attending Hepatologist: Dr. Gregory   Consult requested for: decompensated cirrhosis       Assessment and Recommendation:   Assessment:   Ms. Dewitt is a 58-year-old with a history of alcohol and iron overload cirrhosis complicated by HE, debility, ascites s/p TIPS 10/2018, hx EV and progressive weakness and falls. Family has decided to pursue comfort care measures due to decompensation.       Recommendations:   1. Alcohol/iron overload cirrhosis  2. Ascites  3. Hepatic encephalopathy  4. Goals of care  - MELD 33. Not a transplant candidate due to recent alcohol use. She had previously been referred for liver transplant and declined eval.   - US with patent TIPS. Testing at OSH pending with cultures and ngtd. Other than recent alcohol use, no other cause of decompensation. She did stop going to have paracentesis and not taking medications.   - concern for aspiration due to decreased mentation and controlling secretions.   - Family chose to pursue comfort care. Primary team initiating order set.     Will sign off.     Plan of care discussed with Dr. Gregory    Thank you for the opportunity to be involved in Herminia Dewitt care. Please call with any questions or concerns.     SUSAN Aguilar, CNP  Inpatient Hepatology [facility]  [phone]               History of Present Illness:   Herminia Dewitt is a 58 year old female with a history of alcohol cirrhosis with relapses and recent alcohol use (<1 month). She has reports of iron overload/hemochromatosis. Her liver disease has been complicated by EV s/p bleeding +banding, ascites s/p TIPS 10/15/18, nonocclusive PVT, burst fracture L1, HE who was admitted from her assisted living facility with complaints of jaundice, weakness and falls for the past few days. She was noted to have an elevated  bilirubin to 26 and elevated creatinine. Per daughter, she had received calls from the care team that Herminia had refused her weekly paracentesis and no longer taking her medications. The patient reported to the outside ED that she had drank alcohol within a month ago and the daughter supports this.     Ms. Dewitt is unable to provide history due to confusion and drowsiness. History obtained from prior chart and family. She has not been active the past several days. No documentation of fevers, diarrhea, melena or hematochezia.              Past Medical History:     Past Medical History:   Diagnosis Date     Alcoholic cirrhosis (H)      Ascites     s/p TIPS 10/2018     DVT complicating pregnancy 1982     Esophageal varices (H)      Hemochromatosis      Hyperlipidemia               Past Surgical History:     Past Surgical History:   Procedure Laterality Date     INSERT SHUNT PORTAL TRANSJUGULAR INTRAHEPTIC  10/2018              Social History:     Social History     Tobacco Use     Smoking status: Never Smoker     Smokeless tobacco: Never Used   Substance Use Topics     Alcohol use: Not Currently     Comment: pt states last drink 1 month ago             Family History:   The family history includes Liver Disease in her father.             Immunizations:     Hep A 11/23/05  Hep B 11/23/05, 12/30/05  Prevnar 13 4/9/18  Pneumovax 12/22/12         Allergies:     Allergies   Allergen Reactions     Demerol [Meperidine] Itching     Itching, nausea, vomiting     Morphine Itching     Itching, nausea, vomiting     Sulfa Drugs Itching     Itching             Medications:   @  Medications Prior to Admission   Medication Sig Dispense Refill Last Dose     acetaminophen (TYLENOL) 325 MG tablet Take 650 mg by mouth every 4 hours as needed for pain        escitalopram (LEXAPRO) 10 MG tablet Take 10 mg by mouth daily        ferrous sulfate (FEROSUL) 325 (65 Fe) MG tablet Take 1 tablet by mouth 2 times daily        folic acid  "(FOLVITE) 1 MG tablet Take 1 mg by mouth daily        furosemide (LASIX) 80 MG tablet Take 80 mg by mouth daily        gabapentin (NEURONTIN) 100 MG capsule Take 200 mg by mouth 2 times daily        lactulose (CHRONULAC) 10 GM/15ML solution Take 16.6667 g by mouth 2 times daily        magnesium lactate (MAGTAB) 84 MG (7MEQ) TBCR Take 2-3 tablets by mouth Take 2 tabs every morning, 3 tabs with lunch, 3 tabs with supper        mirtazapine (REMERON) 45 MG tablet Take 22.5 mg by mouth At Bedtime        omeprazole (PRILOSEC) 20 MG DR capsule Take 20 mg by mouth 2 times daily (before meals)        oxyCODONE (ROXICODONE) 5 MG tablet Take 5 mg by mouth every 12 hours as needed for pain        traZODone (DESYREL) 50 MG tablet Take 25 mg by mouth At Bedtime         vitamin B1 (THIAMINE) 100 MG tablet Take 100 mg by mouth daily        ibuprofen (ADVIL/MOTRIN) 200 MG tablet Take 400 mg by mouth 2 times daily as needed for pain        Lidocaine (LIDOCARE) 4 % Patch Place 1 patch onto the skin daily Remove and discard patch within 12 hours or as directed by MD        magnesium hydroxide (MILK OF MAGNESIA) 400 MG/5ML suspension Take 30 mLs by mouth daily as needed        Menthol, Topical Analgesic, 4 % GEL Apply 1 Application topically every 6 hours as needed To legs for pain        POTASSIUM CHLORIDE PO Take 40 mEq by mouth daily         spironolactone (ALDACTONE) 50 MG tablet Take 50 mg by mouth daily        trolamine salicylate (ASPERCREME) 10 % external cream Apply 1 Application topically as needed      @          Review of Systems:    ROS: 10 point ROS neg other than the symptoms noted above in the HPI.          Physical Exam:   Blood pressure 116/52, pulse 101, temperature 97.2  F (36.2  C), temperature source Axillary, resp. rate 16, height 1.702 m (5' 7\"), weight 69.6 kg (153 lb 6.4 oz), SpO2 95 %. Body mass index is 24.03 kg/m .  No intake or output data in the 24 hours ending 05/15/19 1042  General: In no acute distress, " mild facial muscle wasting  Neuro: Opens eyes to verbal only. No other response.  No asterixis  HEENT: PERRL moderate scleral icterus, Nooral lesions  Lymph:  Nocervical lymphadenoapthy  CV: S1/C8gdpmqil murmurs, Skin warm and dry  Lungs: Rhonchi to upper lobes. Diminished lower Respirations even and mildly labored on room air  Abd: Moderately distended, nontender. +BS  Extrem: no lower peripehral edema  Skin: moderate jaundice  Psych: jennifer         Data:     Lab Results   Component Value Date    WBC 4.0 05/15/2019     Lab Results   Component Value Date    RBC 2.18 05/15/2019     Lab Results   Component Value Date    HGB 8.7 05/15/2019     Lab Results   Component Value Date    HCT 27.4 05/15/2019     No components found for: MCT  Lab Results   Component Value Date     05/15/2019     Lab Results   Component Value Date    MCH 39.9 05/15/2019     Lab Results   Component Value Date    MCHC 31.8 05/15/2019     Lab Results   Component Value Date    RDW 18.6 05/15/2019     Lab Results   Component Value Date    PLT 51 05/15/2019       Last Basic Metabolic Panel:  Lab Results   Component Value Date     05/15/2019      Lab Results   Component Value Date    POTASSIUM 3.4 05/15/2019     Lab Results   Component Value Date    CHLORIDE 100 05/15/2019     Lab Results   Component Value Date    TOVA 9.9 05/15/2019     Lab Results   Component Value Date    CO2 21 05/15/2019     Lab Results   Component Value Date    BUN 34 05/15/2019     Lab Results   Component Value Date    CR 1.36 05/15/2019     Lab Results   Component Value Date     05/15/2019       Liver Function Studies -   Recent Labs   Lab Test 05/15/19  0647   PROTTOTAL 6.3*   ALBUMIN 3.1*   BILITOTAL 26.5*   ALKPHOS 129   *   ALT 48       Lab Results   Component Value Date    INR 2.43 05/15/2019       MELD-Na score: 33 at 5/15/2019  6:47 AM  MELD score: 32 at 5/15/2019  6:47 AM  Calculated from:  Serum Creatinine: 1.36 mg/dL at 5/15/2019  6:47 AM  Serum  Sodium: 135 mmol/L at 5/15/2019  6:47 AM  Total Bilirubin: 26.5 mg/dL at 5/15/2019  6:47 AM  INR(ratio): 2.43 at 5/15/2019  6:47 AM  Age: 58 years           Previous Endoscopy:   EGD 11/29/15  Post-procedure Diagnosis:   1.  Esophageal varices with fibrin platelet plug overlying one of   the varices-banding performed.  5 bands placed  2.  No gastric varices    IMAGING:  US abd 5/15/19  Impression:   1. Patent TIPS with normal velocities. The hepatic artery resistive  index is also elevated, nonspecific in the setting of advanced liver  disease, however can be exacerbated with hepatic venous congestion (as  well as postprandial state).   2. Cirrhotic liver with sequelae of portal hypertension, including  retrograde flow in the intrahepatic portal vein and small volume  ascites. No focal liver lesion identified, although poor beam  penetrance limits visualization of the deeper hepatic parenchyma.  3. Cholelithiasis. Mild gallbladder wall thickening, nonspecific but  likely related to the patient's chronic liver disease. No secondary  signs of inflammation to suggest cholecystitis.  5. Middle and left hepatic veins were not visualized.

## 2019-05-15 NOTE — H&P
[facility], [place]    History and Physical - Hospitalist Service, Gold Night       Date of Admission:  5/14/2019    Assessment & Plan   Herminia Dewitt is a 58 year old female admitted on 5/14/2019. She has a history of HLD, alcoholic cirrhosis c/b EV and ascites s/p TIPS (10/2018), hemochromatosis, provoked DVT, and depression and was transferred from OSH ED with decompensated cirrhosis.    # Decompensated Alcohol Cirrhosis. Diagnosed 3/2015, c/b EV and ascites. S/p TIPS 10/2018 with improvement in ascites although still requires paracentesis every 1-2 weeks; last para on 5/6 with 1.2 liters removed. Presented to OSH with progressive weakness and jaundice, noted to have T bili of 26. Reports last drink ~1 month ago although patient's daughter concerned for more recent use. AST/ALT ratio supports this. MeldNa score of 33.  - Start ceftriaxone 2 g q24h for possible SBP  - Continue lactulose 25 mL BID, start rifaximin 550 mg BID  - Hold spironolactone and torsemide overnight  - GI consulted, appreciate assistance  - CAPS consult to evaluate for ascites; labs ordered  - Abdominal US with dopplers  - Ethylene glycol pending  - Daily MELD labs    # AMS, likely HE. Ammonia 116 at OSH although reportedly A&O at that time. On admission to [facility], patient lethargic and confused. + asterixis on exam, consistent with HE. Receives assistance with medications so less likely non-adherence although noted to have loperamide on PTA medication list which would off-set lactulose effects. Differential for AMS includes decompensated liver disease vs GI bleed vs infection vs inadequate lactulose dosing; ?component of Wernicke's.  - Start high-dose thiamine 500 mg q8h  - [place] protocol  - NPO except for medications  - SLP consult for tomorrow  - Holding PTA sedating meds (gabapentin, trazodone, mirtazapine, oxycodone)    # AIMEE. Cr 1.62 at OSH, recent BL ~ 0.6. Concern for hepatorenal syndrome,  "received 1x dose of 100 g albumin prior to transfer. Also possibly pre-renal component from poor PO intake PTA.  - Continue albumin 100 g x 3 days (received 1 dose at OSH)  - Avoid nephrotoxic agents  - I&O, daily weights  - UA/UCx ordered  - Trend BMP    # Acute on Chronic Anemia. Hgb 9.7 at OSH --> 7.9 on admission. Concurrent drop in all cell lines raises suspicion for dilutional component although no IVF given at OSH. Cannot rule out GI bleed although no s/s of bleeding on exam and patient HD stable.  - Start IV ceftriaxone per above  - IV pantoprazole BID  - 2 large bore IV  - Type and screen  - Telemetry  - Repeat CBC, fibrinogen @ 0130  - If HD instability or down-trending Hgb, would add octreotide drip and contact GI  - Check vitamin B12, folate, iron studies in AM    # Acute on Chronic Thrombocytopenia. Platelets 116 at OSH --> 51 on admission. Cannot rule out GI bleed per above although no s/s of bleeding on exam. Possibly 2/2 progressive liver dysfunction.  - Repeat CBC @ 0130    # Prolonged QTc. EKG at OSH with QTc of 533. On escitalopram and trazodone which could be contributing.  - Avoid QTc prolonging medications  - Hold escitalopram, trazodone  - Repeat EKG in AM    # Hyponatremia. Sodium 128 at OSH, previously WNL. Likely 2/2 progressive liver disease.  - Trend BMP    # Chronic Hypokalemia. Maintained on KCl supplements PTA. Potassium 3.0 on admission.  - Check Mg  - Potassium replacement protocol    # Mechanical Falls. With 3 reported falls over the past 24 hours. No LOC or head trauma. Likely 2/2 progressive weakness from liver disease, active alcohol use.  - Fall precautions  - PT/OT consults    # Depression, Insomnia. Per daughter, patient very apathetic and has essentially \"given up\".  diagnosed with stage 4 cancer and not doing well which is likely contributing. Difficult to assess psychiatric status given AMS.  - Holding escitalopram, trazodone with prolonged QTc per above  - Holding " "mirtazapine given AMS  - Consider palliative care +/- health psychology consultation pending clinical course    Diet: NPO except for medications  DVT Prophylaxis: Pneumatic Compression Devices  Rodríguez Catheter: not present  Code Status: DNR - patient has POLST from 5/2018 which states DNR, patient currently states she would be okay with intubation but also notably altered. If clinical changes overnight would call daughter to clarify code status    Disposition Plan   Expected discharge: 3-4 days, recommended to prior living arrangement vs TCU once mental status at baseline and safe disposition plan/ TCU bed available.  Entered: Naty Carpenter PA-C 05/14/2019, 9:52 PM     The patient's care was discussed with the Attending Physician, Dr. Sung and Patient.    Naty Carpenter PA-C  [facility], [place]  Pager: [phone]  Please see sticky note for cross cover information  ______________________________________________________________________    Chief Complaint   Weakness    History is obtained from the patient    History of Present Illness   Herminia Dewitt is a 58 year old female admitted on 5/14/2019. She has a history of HLD, alcoholic cirrhosis c/b EV and ascites s/p TIPS (10/2018), hemochromatosis, provoked DVT, and depression and was transferred from [facility] ED with decompensated cirrhosis.    Patient presented to OSH ED with complaints of progressive weakness resulting in 3 mechanical falls over the past 24 hours. She appeared jaundiced and was noted to have a bilirubin of 26 with elevated liver enzymes. Given concern for decompensated cirrhosis, she was transferred to [facility] for medical optimization.    Currently, patient is lethargic but rouses to voice. Intermittently able to have logical conversation but easily confused, occasional garbled speech. Reports her main concern is feeling \"weak\". Notes she has had several falls to the floor which occurred while " "sitting in a chair. No loss of consciousness or head trauma. Reports having ~2 BM's per day, unable to answer if melena/hematochezia present. Notes her last drink was ~1 month ago and states she stopped \"because I had to\". Denies history of withdrawal seizures. Otherwise no chest pain, SOB, abdominal pain, nausea, vomiting, dysuria, or peripheral edema.    Of note, patient's daughter called the RN this evening and is concerned patient has been drinking as recently as Sunday. Also notes patient's  has stage 4 cancer and is not doing well; she is concerned patient has become very apathetic about her own cares because of this.    Review of Systems    The 10 point Review of Systems is negative other than noted in the HPI.    Past Medical History    I have reviewed this patient's medical history and updated it with pertinent information if needed.   Past Medical History:   Diagnosis Date     Alcoholic cirrhosis (H)      Ascites     s/p TIPS 10/2018     DVT complicating pregnancy 1982     Esophageal varices (H)      Hemochromatosis      Hyperlipidemia      Past Surgical History   I have reviewed this patient's surgical history and updated it with pertinent information if needed.  Past Surgical History:   Procedure Laterality Date     INSERT SHUNT PORTAL TRANSJUGULAR INTRAHEPTIC  10/2018     Social History   I have reviewed this patient's social history and updated it with pertinent information if needed.  Social History     Tobacco Use     Smoking status: Never Smoker     Smokeless tobacco: Never Used   Substance Use Topics     Alcohol use: Not Currently     Comment: pt states last drink 1 month ago     Drug use: Never     Family History   I have reviewed this patient's family history and updated it with pertinent information if needed.   Family History   Problem Relation Age of Onset     Liver Disease Father      Prior to Admission Medications   None     Allergies   Allergies   Allergen Reactions     Demerol " [Meperidine] Itching     Itching, nausea, vomiting     Morphine Itching     Itching, nausea, vomiting     Sulfa Drugs Itching     Itching     Physical Exam   Vital Signs: Temp: 98.2  F (36.8  C) Temp src: Oral BP: 101/42 Pulse: 98   Resp: 16 SpO2: 96 % O2 Device: None (Room air)    Weight: 0 lbs 0 oz    General Appearance: Ill-appearing femlae resting comfortably in bed. Intermittently dozing.  Eyes: Icteric sclera.  HEENT: NC/AT. Slightly dry mucous membranes.  Respiratory: Respiratory effort normal on RA. Diminished lung sounds at bases, otherwise CTAB.  Cardiovascular: RRR, S1/S2. Grade 3-4/6 ZHANE.  GI: Abdomen soft, non-distended, non-tender. Possible minimal ascites. Bowel sounds normoactive.  Skin: Multiple contusions along bilateral upper extremities. Generalized jaundice.  Musculoskeletal/Extremities: No peripheral edema.  Neurologic: Lethargic but opens eyes to voice. Oriented to person, situation but not place or time. + Asterixis. PERRLa. Able to follow simple commands with frequent encouragement. No focal neurologic deficits evident.  Psychiatric: Flat affect.    Data   Data reviewed today: I reviewed all medications, new labs and imaging results over the last 24 hours. I personally reviewed no images or EKG's today.    Recent Labs   Lab 05/14/19  2110   WBC 2.5*   HGB 7.9*   *   PLT 51*   INR 2.26*   *   POTASSIUM 3.0*   CHLORIDE 95   CO2 20   BUN 31*   CR 1.36*   ANIONGAP 14   TOVA 9.8   GLC 55*   ALBUMIN 3.3*   PROTTOTAL 6.6*   BILITOTAL 25.0*   ALKPHOS 115   ALT 42   *

## 2019-05-15 NOTE — PLAN OF CARE
Pt is lethargic, arouses to voice slightly, able to open eyes but then back to sleep, unable to give PO due to lethargy, updated MD and plan for rectal lactulose and rectal tube, daughter called and believed patient would not want rectal lactulose, changed to DNR/DNI per daughters expression of patient wishes, daughter here for conference and patient transitioned to comfort cares, having lots of secretions and desats to upper 70's, oral suction and NG suction per RT, do get small amount of bright red blood while suction, patient having loose incontinent stool, robinul and atropine given to help manage secretions, family at bedside, close monitor to help patients airway, pt appears comfortable, answers yes and no to questions but unable to know if she understands questions

## 2019-05-15 NOTE — PLAN OF CARE
SLP: ST orders received for swallow eval. Following care plan discussion, patient has now been transitioned to comfort cares. Not medically appropriate or stable for ST services. Will complete orders at this time.

## 2019-05-15 NOTE — PHARMACY-ADMISSION MEDICATION HISTORY
Admission medication history interview status for the 5/14/2019 admission is complete. See Epic admission navigator for allergy information, pharmacy, prior to admission medications and immunization status.     Medication history interview sources:  [facility] transfer records    Changes made to PTA medication list (reason)  Added: all medications listed below  Deleted: none  Changed: none    Additional medication history information (including reliability of information, actions taken by pharmacist): medication list obtained solely from review of outside records, patient unable to complete history at this time    Prior to Admission medications    Medication Sig Last Dose Taking? Auth Provider   acetaminophen (TYLENOL) 325 MG tablet Take 650 mg by mouth every 4 hours as needed for pain  Yes Unknown, Entered By History   escitalopram (LEXAPRO) 10 MG tablet Take 10 mg by mouth daily  Yes Unknown, Entered By History   ferrous sulfate (FEROSUL) 325 (65 Fe) MG tablet Take 1 tablet by mouth 2 times daily  Yes Unknown, Entered By History   folic acid (FOLVITE) 1 MG tablet Take 1 mg by mouth daily  Yes Unknown, Entered By History   furosemide (LASIX) 80 MG tablet Take 80 mg by mouth daily  Yes Unknown, Entered By History   gabapentin (NEURONTIN) 100 MG capsule Take 200 mg by mouth 2 times daily  Yes Unknown, Entered By History   lactulose (CHRONULAC) 10 GM/15ML solution Take 16.6667 g by mouth 2 times daily  Yes Unknown, Entered By History   magnesium lactate (MAGTAB) 84 MG (7MEQ) TBCR Take 2-3 tablets by mouth Take 2 tabs every morning, 3 tabs with lunch, 3 tabs with supper  Yes Unknown, Entered By History   mirtazapine (REMERON) 45 MG tablet Take 22.5 mg by mouth At Bedtime  Yes Unknown, Entered By History   omeprazole (PRILOSEC) 20 MG DR capsule Take 20 mg by mouth 2 times daily (before meals)  Yes Unknown, Entered By History   oxyCODONE (ROXICODONE) 5 MG tablet Take 5 mg by mouth every 12 hours as needed for pain  Yes  Unknown, Entered By History   traZODone (DESYREL) 50 MG tablet Take 25 mg by mouth At Bedtime   Yes Unknown, Entered By History   vitamin B1 (THIAMINE) 100 MG tablet Take 100 mg by mouth daily  Yes Unknown, Entered By History   ibuprofen (ADVIL/MOTRIN) 200 MG tablet Take 400 mg by mouth 2 times daily as needed for pain   Unknown, Entered By History   Lidocaine (LIDOCARE) 4 % Patch Place 1 patch onto the skin daily Remove and discard patch within 12 hours or as directed by MD   Unknown, Entered By History   magnesium hydroxide (MILK OF MAGNESIA) 400 MG/5ML suspension Take 30 mLs by mouth daily as needed   Unknown, Entered By History   Menthol, Topical Analgesic, 4 % GEL Apply 1 Application topically every 6 hours as needed To legs for pain   Unknown, Entered By History   POTASSIUM CHLORIDE PO Take 40 mEq by mouth daily    Unknown, Entered By History   spironolactone (ALDACTONE) 50 MG tablet Take 50 mg by mouth daily   Unknown, Entered By History   trolamine salicylate (ASPERCREME) 10 % external cream Apply 1 Application topically as needed   Unknown, Entered By History     Medication history completed by: Natalee Santana, PharmD, BCPS

## 2019-05-15 NOTE — PROGRESS NOTES
"[facility], [place]   Internal Medicine Daily Note           Interval History/Events     Overnight events   Patient is lethargic, and not communicating well  Multiple discussions with daughter over the phone.   They are considering not being aggressive with the cares at this time         Review of Systems        4 point ROS including Respiratory, CV, GI and , other than that noted above is negative      Medications   I have reviewed current medications  in the \"current medication\" section of Epic.  Relevant changes include:     Physical Exam   General:       Vital signs:    Blood pressure 116/52, pulse 101, temperature 97.2  F (36.2  C), temperature source Axillary, resp. rate 16, height 1.702 m (5' 7\"), weight 69.6 kg (153 lb 6.4 oz), SpO2 95 %.  Estimated body mass index is 24.03 kg/m  as calculated from the following:    Height as of this encounter: 1.702 m (5' 7\").    Weight as of this encounter: 69.6 kg (153 lb 6.4 oz).    No intake or output data in the 24 hours ending 05/15/19 1127   HEENT: Scleral Icterus, no pallor  Cardiovascular: S1, S2 normal  Respiratory:  B/L CTA. Decreased breath sounds at bases.   GI/Abdomen: Soft, Moderately distended, bruise marks. NT, BS+  Neurology: Lethargic. Follows minimal commands like opening mouth, eyes, and sticking out tongue, squeezing tongue. No tremors.   Extremities: Pre tibial edema  Skin: Generalized discoloration of skin.      Laboratory and Imaging Studies     I have reviewed  laboratory and imaging studies in the Epic. Pertinent findings are as below:    BMP  Recent Labs   Lab 05/15/19  0647 05/15/19  0430 05/14/19 2110     --  129*   POTASSIUM 3.4 3.7 3.0*   CHLORIDE 100  --  95   TOVA 9.9  --  9.8   CO2 21  --  20   BUN 34*  --  31*   CR 1.36*  --  1.36*   *  --  55*     CBC  Recent Labs   Lab 05/15/19  0647 05/14/19 2110   WBC 4.0 2.5*   RBC 2.18* 1.98*   HGB 8.7* 7.9*   HCT 27.4* 24.1*   * 122*   MCH 39.9* " 39.9*   MCHC 31.8 32.8   RDW 18.6* 18.2*   PLT 51* 51*     INR  Recent Labs   Lab 05/15/19  0647 05/14/19 2110   INR 2.43* 2.26*     LFTs  Recent Labs   Lab 05/15/19  0647 05/14/19 2110   ALKPHOS 129 115   * 162*   ALT 48 42   BILITOTAL 26.5* 25.0*   PROTTOTAL 6.3* 6.6*   ALBUMIN 3.1* 3.3*      PANCNo lab results found in last 7 days.        Impression/Plan        Herminia Dewitt is a 58 year old female admitted on 5/14/2019. She has a history of HLD, alcoholic cirrhosis c/b EV and ascites s/p TIPS (10/2018), hemochromatosis, provoked DVT, and depression and was transferred from OSH ED with decompensated cirrhosis.    # Goals of cares: Given severe encephalopathy plan was to transfer patient to higher level of care, place NG, and Rectal tube and be aggressive with cares. Patient is not able to communicate due to encephalopathy. I have had multiple discussion with the daughter over the phone. Family did not want aggressive measure. Plan to meet with family in hour or so.        # Decompensated Alcohol Cirrhosis. Diagnosed 3/2015, c/b EV and ascites. S/p TIPS 10/2018 with improvement in ascites although still requires paracentesis every 1-2 weeks; last para on 5/6 with 1.2 liters removed. Presented to OSH with progressive weakness and jaundice, noted to have T bili of 26. Reports last drink ~1 month ago although patient's daughter concerned for more recent use. AST/ALT ratio supports this. MeldNa score of 33.  US Doppler on 05/15: Patent TIPS with normal velocities. The hepatic artery resistive  index is also elevated, nonspecific in the setting of advanced liver disease, however can be exacerbated with hepatic venous congestion   - Continue ceftriaxone 2 g q24h for possible SBP  - Continue lactulose 25 mL BID, start rifaximin 550 mg BID  - Lactulose Midnight protocol  - Hold spironolactone and torsemide   - GI consulted, appreciate assistance. Discussed with GI on 05/15  - CAPS consult to evaluate  for ascites; labs ordered. Did not want para at this time  - Ethylene glycol pending  - Daily MELD labs    # AMS, likely HE. Ammonia 116 at OSH although reportedly A&O at that time. On admission to [facility], patient lethargic and confused. + asterixis on exam, consistent with HE. Receives assistance with medications so less likely non-adherence although noted to have loperamide on PTA medication list which would off-set lactulose effects. Differential for AMS includes decompensated liver disease vs GI bleed vs infection vs inadequate lactulose dosing; ?component of Wernicke's.  - Continue  high-dose thiamine 500 mg q8h  - [facility] protocol  - NPO except for medications  - SLP consult   - Holding PTA sedating meds (gabapentin, trazodone, mirtazapine, oxycodone)     # AIMEE. Cr 1.62 at OSH, recent BL ~ 0.6. Concern for hepatorenal syndrome, received 1x dose of 100 g albumin prior to transfer. Also possibly pre-renal component from poor PO intake PTA.  - Continue albumin 100 g x 3 days (received 1 dose at OSH)  - Avoid nephrotoxic agents  - I&O, daily weights  - UA/UCx ordered  - Trend BMP     # Acute on Chronic Anemia. Hgb 9.7 at OSH --> 7.9 on admission. Concurrent drop in all cell lines raises suspicion for dilutional component although no IVF given at OSH. Cannot rule out GI bleed although no s/s of bleeding on exam and patient HD stable.  - Continue IV ceftriaxone per above  - IV pantoprazole BID  - 2 large bore IV  - Type and screen  - Telemetry  - If HD instability or down-trending Hgb, would add octreotide drip and contact GI  - Check vitamin B12, folate, iron studies in AM     # Acute on Chronic Thrombocytopenia. Platelets 116 at OSH --> 51 on admission. Cannot rule out GI bleed per above although no s/s of bleeding on exam. Possibly 2/2 progressive liver dysfunction.Platelet level 51 on 05/15.     # Prolonged QTc. EKG at OSH with QTc of 533. On escitalopram and trazodone which could be contributing.  - Avoid QTc  "prolonging medications  - Hold escitalopram, trazodone  - Repeat EKG in AM     # Hyponatremia. Sodium 128 at OSH, previously WNL. Likely 2/2 progressive liver disease. Na level 135 on 05/15  - Trend BMP     # Chronic Hypokalemia. Maintained on KCl supplements PTA. Potassium 3.0 on admission. K level 3.4 on 05/15  - Check Mg  - Potassium replacement protocol     # Mechanical Falls. With 3 reported falls over the past 24 hours. No LOC or head trauma. Likely 2/2 progressive weakness from liver disease, active alcohol use.  - Fall precautions  - PT/OT consults     # Depression, Insomnia. Per daughter, patient very apathetic and has essentially \"given up\".  diagnosed with stage 4 cancer and not doing well which is likely contributing. Difficult to assess psychiatric status given AMS.  - Holding escitalopram, trazodone with prolonged QTc per above  - Holding mirtazapine given AMS  - Consider palliative care +/- health psychology consultation pending clinical course     Diet: NPO except for medications  DVT Prophylaxis: Pneumatic Compression Devices  Rodríguez Catheter: not present  Code Status: DNR on the chart. Patient unable to communicate.  has terminal cancer, and not doing well. Daughter is active in the cares. Per daughter, she is DNR/DNI. Family thinking of not aggressive cares.    Disposition Plan   Expected discharge in unknown  days to pending goals of care discussion .     Entered: Philip Hagen 05/15/2019, 11:27 AM            Pt's care was discussed with bedside RN, patient and  during Care Team Rounds.               Philip Hagen MD  Hospitalist ( Internal medicine)  Pager: [phone]     "

## 2019-05-15 NOTE — PROGRESS NOTES
Prolonged discussion with Daughter Melissa, and GI face to face  Daughter had brought in patient's health care directive. It has mentioned no tube, IV fluids, nutrition. Patient is DNR/DNI  Daughter had also talked with Father (who is also fighting terminal illness),and her brothers. All are in agreement that due to patient's terminal illness and her wishes comfort care is most appropriate plan of care going forward  Hence, I will be placing her on comfort care.  Social work contacted who will talk with the family    Philip Hagen  Internal Medicine  Hospitalist  Pager no: [phone]

## 2019-05-15 NOTE — PROVIDER NOTIFICATION
Lactic acid 2.0, gold cross cover paged per sepsis protocol.  Will continue to monitor and await further orders.

## 2019-05-15 NOTE — PLAN OF CARE
OT/5A: CANCEL - orders received and acknowledged, however, at this time goals of care being determined. Will hold initiation of OT services until POC established.

## 2019-05-15 NOTE — PLAN OF CARE
Patient confused and lethargic but responsive to voice. Vitals stable except for HR (Tachycardic in low 100's). On [facility] protocol. Lactulose given Q2hrs. Triggered sepsis. Lactic acid 2.0.  MD notified Repeat potassium 3.7. Hypoglycemic protocol. BG's 144 and 136. Patient incontinent of urine. No BM this shift. NPO for Abdominal US. Will continue to monitor and follow plan of care.

## 2019-05-15 NOTE — PLAN OF CARE
OT/5A: Following care plan discussion, patient has now been transitioned to comfort cares. Not medically appropriate or stable for OT services. Will complete orders at this time.

## 2019-05-15 NOTE — PLAN OF CARE
PT 5A: Holding - PT orders acknowledged and appreciated. Pt with goals of care discussion pending. Will initiate eval as indicated once POC established. Thank you for your referral.     Pt transitioned to comfort measures. Will complete orders.

## 2019-05-16 NOTE — DEATH PRONOUNCEMENT
MD DEATH PRONOUNCEMENT    Called to pronounce Herminia Dewitt dead.    Physical Exam: Unresponsive to noxious stimuli, Spontaneous respirations absent, Breath sounds absent, Carotid pulse absent, Heart sounds absent and Pupillary light reflex absent    Patient was pronounced dead at 8:10 AM, May 16, 2019.    No data filed        Active Problems:    Alcoholic cirrhosis (H)       Infectious disease present?: NO    Communicable disease present? (examples: HIV, chicken pox, TB, Ebola, CJD) :  NO    Multi-drug resistant organism present? (example: MRSA): NO    Please consider an autopsy if any of the following exist:  NO Unexpected or unexplained death during or following any dental, medical, or surgical diagnostic treatment procedures.   NO Death of mother at or up to seven days after delivery.     NO All  and pediatric deaths.     NO Death where the cause is sufficiently obscure to delay completion of the death certificate.   NO Deaths in which autopsy would confirm a suspected illness/condition that would affect surviving family members or recipients of transplanted organs.     The following deaths must be reported to the 's Office:  NO A death that may be due entirely or in part to any factors other than natural disease (recent surgery, recent trauma, suspected abuse/neglect).   NO A death that may be an accident, suicide, or homicide.     NO Any sudden, unexpected death in which there is no prior history of significant heart disease or any other condition associated with sudden death.   NO A death under suspicious, unusual, or unexpected circumstances.    NO Any death which is apparently due to natural causes but in which the  does not have a personal physician familiar with the patient s medical history, social, or environmental situation or the circumstances of the terminal event.   NO Any death apparently due to Sudden Infant Death Syndrome.     NO Deaths that occur during, in  association with, or as consequences of a diagnostic, therapeutic, or anesthetic procedure.   NO Any death in which a fracture of a major bone has occurred within the past (6) six months.   NO A death of persons note seen by their physician within 120 days of demise.     NO Any death in which the  was an inmate of a public institution or was in the custody of Law Enforcement personnel.   NO  All unexpected deaths of children   NO Solid organ donors   NO Unidentified bodies   NO Deaths of persons whose bodies are to be cremated or otherwise disposed of so that the bodies will later be unavailable for examination;   NO Deaths unattended by a physician outside of a licensed healthcare facility or licensed residential hospice program   NO Deaths occurring within 24 hours of arrival to a health care facility if death is unexpected.    NO Deaths associated with the decedent s employment.   NO Deaths attributed to acts of terrorism.   NO Any death in which there is uncertainty as to whether it is a medical examiner s care should be discussed with the medical investigator.        Body disposition: Autopsy was discussed with family member:  Daughter in person.  Permission for autopsy was declined.

## 2019-05-16 NOTE — PLAN OF CARE
Pt stopped breathing at 0810, md notified, family at bedside, family with body until 0915, body cleaned and placed in bag, no organ donation per hotline and no autopsy, PIV's removed, death packet completed and pt will be sent to [facility]

## 2019-05-16 NOTE — PLAN OF CARE
Patient continues on comfort cares with PRN dilaudid and ativan given. Pt's breathing has become more irregular during the shift, secretions pooling and audible when breathing. Approx 8 family members staying over night with patient. O2 was administered for comfort, but family decided to take it off, but would still like to keep continuous pulse ox. Scant amounts of urine in brief, family declined to have it changed. Family pursuing options for hospice if patient is stable to move. Will continue to monitor.

## 2019-05-16 NOTE — DISCHARGE SUMMARY
Discharge/Death Summary    Herminia Dewitt MRN# [id number]   YOB: 1960 Age: 58 year old     Date of Admission:  5/14/2019  Date of Discharge:  5/16/2019 10:10 AM  Admitting Physician:  Lorenzo Sung MD  Discharge Physician:  Philip Hagen MD   Discharging Service:  Internal Medicine     Primary Provider: Vitor Denton     Diagnosis:     Hepatic Encephalopathy  Decompensated Alcohol Cirrhosis  AIMEE  Acute on Chronic Anemia  Acute on Chronic Thrombocytopenia            Brief History of Illness:     Herminia Dewitt is a 58 year old female who was admitted for altered mental status    For more details, please refer to the admission H&P on 5/14/2019             Hospital Course:     Herminia Dewitt is a 58 year old female admitted on 5/14/2019. She has a history of HLD, alcoholic cirrhosis c/b EV and ascites s/p TIPS (10/2018), hemochromatosis, provoked DVT, and depression and was transferred from [facility] ED with decompensated cirrhosis, altered mental status.   She was started on aggressive lactulose [place] protocol. GI was consulted. She was given Ceftriaxone for possible SBP. She was started on Albumin 100 gm daily. She was given high dose Thiamine. Labs were monitored.   Next morning, after extensive discussion with daughter, and GI team she was made comfort care which was her health care directive.   She passed away on 05/16/2019 at 8:10 AM.     Individual problems as outlined below:      # Goals of cares: Given severe encephalopathy plan was to transfer patient to higher level of care, place NG, and Rectal tube and be aggressive with cares. Patient is not able to communicate due to encephalopathy. I have had multiple discussion with the daughter over the phone. Later family decided to make her comfortable and opt out of aggressive care per her health care directives.          # Decompensated Alcohol Cirrhosis. Diagnosed 3/2015, c/b EV and ascites. S/p TIPS  10/2018 with improvement in ascites although still requires paracentesis every 1-2 weeks; last para on 5/6 with 1.2 liters removed. Presented to OSH with progressive weakness and jaundice, noted to have T bili of 26. Reports last drink ~1 month ago although patient's daughter concerned for more recent use. AST/ALT ratio supports this. MeldNa score of 33.  US Doppler on 05/15: Patent TIPS with normal velocities. The hepatic artery resistive  index is also elevated, nonspecific in the setting of advanced liver disease, however can be exacerbated with hepatic venous congestion   - Continue ceftriaxone 2 g q24h for possible SBP  - Continue lactulose 25 mL BID, start rifaximin 550 mg BID  - Lactulose [place] protocol  - Hold spironolactone and torsemide   - GI consulted, appreciate assistance. Discussed with GI on 05/15  - CAPS consult to evaluate for ascites; labs ordered. Did not want para at this time  - Ethylene glycol pending  - Daily MELD labs    # AMS, likely HE. Ammonia 116 at OSH although reportedly A&O at that time. On admission to [facility], patient lethargic and confused. + asterixis on exam, consistent with HE. Receives assistance with medications so less likely non-adherence although noted to have loperamide on PTA medication list which would off-set lactulose effects. Differential for AMS includes decompensated liver disease vs GI bleed vs infection vs inadequate lactulose dosing; ?component of Wernicke's.  - Continue  high-dose thiamine 500 mg q8h  - [place] protocol  - NPO except for medications  - SLP consult   - Holding PTA sedating meds (gabapentin, trazodone, mirtazapine, oxycodone)     # AIMEE. Cr 1.62 at OSH, recent BL ~ 0.6. Concern for hepatorenal syndrome, received 1x dose of 100 g albumin prior to transfer. Also possibly pre-renal component from poor PO intake PTA.  - Continue albumin 100 g x 3 days (received 1 dose at OSH)  - Avoid nephrotoxic agents  - I&O, daily weights  - UA/UCx ordered  -  "Trend BMP     # Acute on Chronic Anemia. Hgb 9.7 at OSH --> 7.9 on admission. Concurrent drop in all cell lines raises suspicion for dilutional component although no IVF given at OSH. Cannot rule out GI bleed although no s/s of bleeding on exam and patient HD stable.  - Continue IV ceftriaxone per above  - IV pantoprazole BID  - 2 large bore IV  - Type and screen  - Telemetry  - If HD instability or down-trending Hgb, would add octreotide drip and contact GI  - Check vitamin B12, folate, iron studies in AM     # Acute on Chronic Thrombocytopenia. Platelets 116 at OSH --> 51 on admission. Cannot rule out GI bleed per above although no s/s of bleeding on exam. Possibly 2/2 progressive liver dysfunction.Platelet level 51 on 05/15.      # Prolonged QTc. EKG at OSH with QTc of 533. On escitalopram and trazodone which could be contributing.  - Avoid QTc prolonging medications  - Hold escitalopram, trazodone  - Repeat EKG in AM     # Hyponatremia. Sodium 128 at OSH, previously WNL. Likely 2/2 progressive liver disease. Na level 135 on 05/15  - Trend BMP     # Chronic Hypokalemia. Maintained on KCl supplements PTA. Potassium 3.0 on admission. K level 3.4 on 05/15  - Check Mg  - Potassium replacement protocol     # Mechanical Falls. With 3 reported falls over the past 24 hours. No LOC or head trauma. Likely 2/2 progressive weakness from liver disease, active alcohol use.  - Fall precautions  - PT/OT consults     # Depression, Insomnia. Per daughter, patient very apathetic and has essentially \"given up\".  diagnosed with stage 4 cancer and not doing well which is likely contributing. Difficult to assess psychiatric status given AMS.  - Holding escitalopram, trazodone with prolonged QTc per above  - Holding mirtazapine given AMS  - Consider palliative care +/- health psychology consultation pending clinical course     Diet: NPO except for medications  DVT Prophylaxis: Pneumatic Compression Devices  Rodríguez Catheter: not " present  Code Status: DNR/DNI                         Discharge Medications:     Discharge Medication List as of 5/16/2019 10:11 AM      CONTINUE these medications which have NOT CHANGED    Details   acetaminophen (TYLENOL) 325 MG tablet Take 650 mg by mouth every 4 hours as needed for pain, Historical      escitalopram (LEXAPRO) 10 MG tablet Take 10 mg by mouth daily, Historical      ferrous sulfate (FEROSUL) 325 (65 Fe) MG tablet Take 1 tablet by mouth 2 times daily, Historical      folic acid (FOLVITE) 1 MG tablet Take 1 mg by mouth daily, Historical      furosemide (LASIX) 80 MG tablet Take 80 mg by mouth daily, Historical      gabapentin (NEURONTIN) 100 MG capsule Take 200 mg by mouth 2 times daily, Historical      ibuprofen (ADVIL/MOTRIN) 200 MG tablet Take 400 mg by mouth 2 times daily as needed for pain, Historical      lactulose (CHRONULAC) 10 GM/15ML solution Take 16.6667 g by mouth 2 times daily, Historical      Lidocaine (LIDOCARE) 4 % Patch Place 1 patch onto the skin daily Remove and discard patch within 12 hours or as directed by MDHistorical      magnesium hydroxide (MILK OF MAGNESIA) 400 MG/5ML suspension Take 30 mLs by mouth daily as needed, Historical      magnesium lactate (MAGTAB) 84 MG (7MEQ) TBCR Take 2-3 tablets by mouth Take 2 tabs every morning, 3 tabs with lunch, 3 tabs with supper, Historical      Menthol, Topical Analgesic, 4 % GEL Apply 1 Application topically every 6 hours as needed To legs for painHistorical      mirtazapine (REMERON) 45 MG tablet Take 22.5 mg by mouth At Bedtime, Historical      omeprazole (PRILOSEC) 20 MG DR capsule Take 20 mg by mouth 2 times daily (before meals), Historical      oxyCODONE (ROXICODONE) 5 MG tablet Take 5 mg by mouth every 12 hours as needed for pain, Historical      POTASSIUM CHLORIDE PO Take 40 mEq by mouth daily , Historical      spironolactone (ALDACTONE) 50 MG tablet Take 50 mg by mouth daily, Historical      traZODone (DESYREL) 50 MG tablet  "Take 25 mg by mouth At Bedtime , Historical      trolamine salicylate (ASPERCREME) 10 % external cream Apply 1 Application topically as neededHistorical      vitamin B1 (THIAMINE) 100 MG tablet Take 100 mg by mouth daily, Historical                 Procedures/Consultations:   No procedures performed during this admission  No consultations were requested during this admission           Final Day of Progress before Discharge:     Patient passed away    Physical Exam:  Blood pressure 116/52, pulse 101, temperature 97.2  F (36.2  C), temperature source Axillary, resp. rate 16, height 1.702 m (5' 7\"), weight 69.6 kg (153 lb 6.4 oz), SpO2 95 %.      General:   HEENT:  CVS/Chest:  Respiratory/Chest:  GI/Abdomen/:  Extremities:  Others:    Data:  All laboratory data reviewed             Condition on Discharge:   Discharge condition:    Code status on discharge:                 Discharge Disposition:   Discharged               Pending Results:   Unresulted Labs Ordered in the Past 30 Days of this Admission     No orders found from 3/15/2019 to 5/15/2019.                  Discharge Instructions and Follow-Up:   No discharge procedures on file.       Attestation:  Philip Hagen.    Time spent on patient: 45 minutes total including face to face and coordinating care time reviewing current illness, any medication changes, and the care plan for today.             "

## 2019-05-16 NOTE — PLAN OF CARE
Pt on comfort cares. PRN PO dilaudid given q2h. Atropine and Robinol given PRN for secretion management. PRN IV ativan given once for agitation. Pt repositioned for comfort. Family at bedside. Pt has two PIV's. Pt sleeping and has audible rattling in lungs with respirations. Will cont to monitor and follow POC.

## 2019-05-17 LAB — INTERPRETATION ECG - MUSE: NORMAL

## 2019-05-20 ENCOUNTER — RECORDS - HEALTHEAST (OUTPATIENT)
Dept: ADMINISTRATIVE | Facility: OTHER | Age: 59
End: 2019-05-20

## 2019-08-31 ENCOUNTER — COMMUNICATION - HEALTHEAST (OUTPATIENT)
Dept: SCHEDULING | Facility: CLINIC | Age: 59
End: 2019-08-31

## 2021-05-25 ENCOUNTER — RECORDS - HEALTHEAST (OUTPATIENT)
Dept: ADMINISTRATIVE | Facility: CLINIC | Age: 61
End: 2021-05-25

## 2021-05-27 ENCOUNTER — RECORDS - HEALTHEAST (OUTPATIENT)
Dept: ADMINISTRATIVE | Facility: CLINIC | Age: 61
End: 2021-05-27

## 2021-05-29 ENCOUNTER — RECORDS - HEALTHEAST (OUTPATIENT)
Dept: ADMINISTRATIVE | Facility: CLINIC | Age: 61
End: 2021-05-29

## 2021-05-31 ENCOUNTER — RECORDS - HEALTHEAST (OUTPATIENT)
Dept: ADMINISTRATIVE | Facility: CLINIC | Age: 61
End: 2021-05-31

## 2021-06-01 ENCOUNTER — RECORDS - HEALTHEAST (OUTPATIENT)
Dept: ADMINISTRATIVE | Facility: CLINIC | Age: 61
End: 2021-06-01

## 2021-06-01 VITALS — BODY MASS INDEX: 21.76 KG/M2 | WEIGHT: 141 LBS

## 2021-06-01 VITALS — BODY MASS INDEX: 22.38 KG/M2 | WEIGHT: 145 LBS

## 2021-06-01 VITALS — WEIGHT: 141 LBS | BODY MASS INDEX: 21.76 KG/M2

## 2021-06-01 VITALS — WEIGHT: 135 LBS | BODY MASS INDEX: 20.83 KG/M2

## 2021-06-01 VITALS — BODY MASS INDEX: 22.13 KG/M2 | WEIGHT: 146 LBS | HEIGHT: 68 IN

## 2021-06-01 VITALS — WEIGHT: 149 LBS | BODY MASS INDEX: 22.99 KG/M2

## 2021-06-02 ENCOUNTER — RECORDS - HEALTHEAST (OUTPATIENT)
Dept: ADMINISTRATIVE | Facility: CLINIC | Age: 61
End: 2021-06-02

## 2021-06-17 NOTE — SEDATION DOCUMENTATION
Pt. Tolerated procedure well. 6650 mL removed by paracentesis. Pt. Was given 50gm 25% Albumin, per orders. VSS throughout. Pt. Discharged via u/s tech.

## 2021-06-18 NOTE — PROGRESS NOTES
[facility]    DATE: 2018    NAME:  Herminia Dewitt             :  1960  MRN: [id number]  CODE STATUS:  FULL CODE    VISIT TYPE: Problem Visit (ed visit)     FACILITY:  [facility] [[id number]]       CHIEF COMPLAIN/REASON FOR VISIT:    Chief Complaint   Patient presents with     Problem Visit     ed visit               HISTORY OF PRESENT ILLNESS: Herminia Dewitt is a 57 y.o. female who was admitted - for dizziness, leg cramps, tachycardia. Her K was 2.5 and mg 0.8. Her electrolytes were replaced, held lasix and spirinolactone. Her leg cramps improved as electrolytes normalized. She was noted to have hepatic encephalopathy and lactulose was increased. She had paracentesis on  with 4Liters removed. Restarted spirinolactone and lasix. She was found to have E coli and klebsiella E coli and was treated with ceftriaxone. She has PMH of GI leed with esophageal varices, alcoholic liver cirrhosis, hypotension, HLD, anxiety depression, chronic anemia, hyponatremia, DVT. Prior to this she lived at home with  who was recently diagnosed with stage IV stomach cancer.     Today Ms. Dewitt states she was supposed to go home on  but she wasn't feeling well on Saturday. She says her legs were cramping up again and she felt very weak and could not get around as easily as before. Her family was concerned and they had her sent to the ED. She says her electrolytes were low again and they replaced them and sent her back. She is wondering now when she will go home since she didn't yesterday. She did have therapy yesterday and she thinks she will today. She reports feeling much better today and is supposed to have her labs checked today. She also has a paracentesis today and feels that her belly is huge and very hard and full of fluid. She hopes they take a lot off today. She does think the swelling in her legs is a little better. She  denies any other concerns, just continues to have urgent and loose stools at times. Per staff they did call on call on Saturday who gave ok to send to the ED for evaluation since no provider was available in house to see her that day. They replaced her potassium and magnesium IV and sent her back. They also said not to discharge her the next day. Per  planning to issue letter of non coverage tomorrow, so will probably keep her until Saturday. They said they are also now recommending HH therapy so can have a  at home, as her  is concerned she will not be able to care for herself properly at home and may return to drinking once returns home.       REVIEW OF SYSTEMS:  PROBLEMS AND REVIEW OF SYSTEMS:   Review of Systems  Today on ROS:   Currently, no fever, chills, or rigors. Does not have any visual or hearing problems. Denies any chest pain, headaches, palpitations, lightheadedness, dizziness, shortness of breath, or cough. Appetite is good. Denies any GERD symptoms. Denies any difficulty with swallowing, nausea, or vomiting. Denies any urinary symptoms. No insomnia. No active bleeding. No rash. Positive for abdominal bloating, ascites, diarrhea at times, leg swelling, no cramping today, weakness improved from saturday      Allergies   Allergen Reactions     Demerol [Meperidine] Itching and Nausea And Vomiting     Morphine Itching and Nausea And Vomiting     Sulfa (Sulfonamide Antibiotics) Itching     Current Outpatient Prescriptions   Medication Sig     cefuroxime (CEFTIN) 500 MG tablet Take 1 tablet (500 mg total) by mouth 2 (two) times a day with meals for 10 days.     escitalopram oxalate (LEXAPRO) 10 MG tablet Take 10 mg by mouth daily.     FOLIC ACID/MULTIVIT-MINERALS (WOMEN'S MULTIVITAMIN GUMMIES ORAL) Take 1 tablet by mouth daily.      furosemide (LASIX) 20 MG tablet Take 1 tablet (20 mg total) by mouth daily. (Patient taking differently: Take 40 mg by mouth daily. )      gabapentin (NEURONTIN) 100 MG capsule Take 100 mg by mouth 2 (two) times a day.     lactulose (ENULOSE) 10 gram/15 mL solution Take 20 g by mouth 2 (two) times a day.     magnesium oxide (MAG-OX) 400 mg tablet Take 1 tablet (400 mg total) by mouth 3 (three) times a day. (Patient taking differently: Take 800 mg by mouth 3 (three) times a day. )     melatonin 3 mg Tab tablet Take 1 tablet (3 mg total) by mouth at bedtime as needed.     mirtazapine (REMERON) 15 MG tablet Take 1 tablet (15 mg total) by mouth at bedtime.     potassium chloride (K-DUR,KLOR-CON) 20 MEQ tablet Take 1 tablet (20 mEq total) by mouth daily for 7 days. (Patient taking differently: Take 40 mEq by mouth daily. )     spironolactone (ALDACTONE) 25 MG tablet Take 1 tablet (25 mg total) by mouth daily.     trolamine salicylate (ASPERCREME) 10 % cream Apply 1 application topically as needed.     Past Medical History:    Past Medical History:   Diagnosis Date     Alcoholic cirrhosis of liver 3/29/2015     Alcoholism      Coagulopathy 3/29/2015     DVT (deep vein thrombosis) in pregnancy 1982     Esophageal varices      GI bleed      Hemochromatosis      History of transfusion      Hyperlipidemia      Hypotension 3/29/2015     Tremor            PHYSICAL EXAMINATION  Vitals:    05/28/18 2020   BP: 114/66   Pulse: 84   Resp: 16   Temp: 98.3  F (36.8  C)   SpO2: 98%   Weight: 149 lb (67.6 kg)       Today on physical exam:     GENERAL: Awake, Alert, oriented x3, not in any form of acute distress, answers questions appropriately, follows simple commands, conversant  HEENT: Head is normocephalic with normal hair distribution. No evidence of trauma. Ears: No acute purulent discharge. Eyes: Conjunctivae pink with no scleral jaundice. Nose: Normal mucosa and septum. NECK: Supple with no cervical or supraclavicular lymphadenopathy. Trachea is midline.   CHEST: No tenderness or deformity, no crepitus  LUNG: dim to auscultation with good chest expansion. There are  no crackles or wheezes, normal AP diameter.  BACK: No kyphosis of the thoracic spine. Symmetric, no curvature, ROM normal, no CVA tenderness, no spinal tenderness   CVS: There is good S1  S2, regular rhythm, there are no murmurs, rubs, gallops, or heaves,  2+ pulses symmetric in all extremities.  ABDOMEN: Rounded and soft, moderately distended, ascites  EXTREMITIES: 1+ ble pitting pedal edema, Atraumatic. Limited range of motion in ankles.  SKIN: Warm and dry, no erythema noted.  Skin color, texture, no rashes or lesions.  NEUROLOGICAL: The patient is oriented to person, place and time. Strength and sensation are grossly intact. Face is symmetric.            LABS:   Recent Results (from the past 168 hour(s))   Basic Metabolic Panel   Result Value Ref Range    Sodium 132 (L) 136 - 145 mmol/L    Potassium 3.5 3.5 - 5.0 mmol/L    Chloride 99 98 - 107 mmol/L    CO2 22 22 - 31 mmol/L    Anion Gap, Calculation 11 5 - 18 mmol/L    Glucose 112 70 - 125 mg/dL    Calcium 9.6 8.5 - 10.5 mg/dL    BUN 6 (L) 8 - 22 mg/dL    Creatinine 0.74 0.60 - 1.10 mg/dL    GFR MDRD Af Amer >60 >60 mL/min/1.73m2    GFR MDRD Non Af Amer >60 >60 mL/min/1.73m2   Hepatic Profile   Result Value Ref Range    Bilirubin, Total 2.8 (H) 0.0 - 1.0 mg/dL    Bilirubin, Direct 1.4 (H) <=0.5 mg/dL    Protein, Total 7.4 6.0 - 8.0 g/dL    Albumin 2.9 (L) 3.5 - 5.0 g/dL    Alkaline Phosphatase 179 (H) 45 - 120 U/L    AST 39 0 - 40 U/L    ALT 19 0 - 45 U/L   Magnesium   Result Value Ref Range    Magnesium 1.4 (L) 1.8 - 2.6 mg/dL   HM2(CBC w/o Differential)   Result Value Ref Range    WBC 3.9 (L) 4.0 - 11.0 thou/uL    RBC 3.49 (L) 3.80 - 5.40 mill/uL    Hemoglobin 9.3 (L) 12.0 - 16.0 g/dL    Hematocrit 30.7 (L) 35.0 - 47.0 %    MCV 88 80 - 100 fL    MCH 26.6 (L) 27.0 - 34.0 pg    MCHC 30.3 (L) 32.0 - 36.0 g/dL    RDW 18.3 (H) 11.0 - 14.5 %    Platelets 133 (L) 140 - 440 thou/uL    MPV 9.3 8.5 - 12.5 fL   INR   Result Value Ref Range    INR 1.27 (H) 0.90 - 1.10    Ammonia   Result Value Ref Range    Ammonia 38 (H) 11 - 35 umol/L   ECG 12 lead nursing unit performed   Result Value Ref Range    SYSTOLIC BLOOD PRESSURE 130 mmHg    DIASTOLIC BLOOD PRESSURE 65 mmHg    VENTRICULAR RATE 72 BPM    ATRIAL RATE 72 BPM    P-R INTERVAL 120 ms    QRS DURATION 74 ms    Q-T INTERVAL 472 ms    QTC CALCULATION (BEZET) 516 ms    P Axis -7 degrees    R AXIS 5 degrees    T AXIS 11 degrees    MUSE DIAGNOSIS       Normal sinus rhythm  Low voltage QRS  Borderline ECG  When compared with ECG of 16-MAY-2018 12:55,  Vent. rate has decreased BY  58 BPM  ST no longer depressed in Lateral leads  Confirmed by JANAY BAJWA MD LOC: ([id number]) on 5/27/2018 1:02:38 PM     Comprehensive metabolic panel   Result Value Ref Range    Sodium 135 (L) 136 - 145 mmol/L    Potassium 2.9 (L) 3.5 - 5.0 mmol/L    Chloride 96 (L) 98 - 107 mmol/L    CO2 28 22 - 31 mmol/L    Anion Gap, Calculation 11 5 - 18 mmol/L    Glucose 86 70 - 125 mg/dL    BUN 7 (L) 8 - 22 mg/dL    Creatinine 0.73 0.60 - 1.10 mg/dL    GFR MDRD Af Amer >60 >60 mL/min/1.73m2    GFR MDRD Non Af Amer >60 >60 mL/min/1.73m2    Bilirubin, Total 1.9 (H) 0.0 - 1.0 mg/dL    Calcium 9.5 8.5 - 10.5 mg/dL    Protein, Total 8.1 (H) 6.0 - 8.0 g/dL    Albumin 3.1 (L) 3.5 - 5.0 g/dL    Alkaline Phosphatase 182 (H) 45 - 120 U/L    AST 41 (H) 0 - 40 U/L    ALT 21 0 - 45 U/L   Lipase   Result Value Ref Range    Lipase 102 (H) 0 - 52 U/L   INR   Result Value Ref Range    INR 1.27 (H) 0.90 - 1.10   APTT   Result Value Ref Range    PTT 39 (H) 24 - 37 seconds   Magnesium   Result Value Ref Range    Magnesium 1.2 (L) 1.8 - 2.6 mg/dL   HM1 (CBC with Diff)   Result Value Ref Range    WBC 3.5 (L) 4.0 - 11.0 thou/uL    RBC 3.64 (L) 3.80 - 5.40 mill/uL    Hemoglobin 9.6 (L) 12.0 - 16.0 g/dL    Hematocrit 30.9 (L) 35.0 - 47.0 %    MCV 85 80 - 100 fL    MCH 26.4 (L) 27.0 - 34.0 pg    MCHC 31.1 (L) 32.0 - 36.0 g/dL    RDW 18.2 (H) 11.0 - 14.5 %    Platelets 150 140 - 440 thou/uL     MPV 9.3 8.5 - 12.5 fL    Neutrophils % 65 50 - 70 %    Lymphocytes % 18 (L) 20 - 40 %    Monocytes % 13 (H) 2 - 10 %    Eosinophils % 3 0 - 6 %    Basophils % 1 0 - 2 %    Neutrophils Absolute 2.3 2.0 - 7.7 thou/uL    Lymphocytes Absolute 0.6 (L) 0.8 - 4.4 thou/uL    Monocytes Absolute 0.5 0.0 - 0.9 thou/uL    Eosinophils Absolute 0.1 0.0 - 0.4 thou/uL    Basophils Absolute 0.0 0.0 - 0.2 thou/uL   Ammonia   Result Value Ref Range    Ammonia 20 11 - 35 umol/L   Lactic Acid   Result Value Ref Range    Lactic Acid 1.4 0.5 - 2.2 mmol/L   Type and screen   Result Value Ref Range    ABORh A POS     Antibody Screen Negative Negative   Urinalysis-UC if Indicated   Result Value Ref Range    Color, UA Yellow Colorless, Yellow, Straw, Light Yellow    Clarity, UA Clear Clear    Glucose, UA Negative Negative    Bilirubin, UA Negative Negative    Ketones, UA Negative Negative    Specific Gravity, UA 1.005 1.001 - 1.030    Blood, UA Negative Negative    pH, UA 7.0 4.5 - 8.0    Protein, UA Negative Negative mg/dL    Urobilinogen, UA <2.0 E.U./dL <2.0 E.U./dL, 2.0 E.U./dL    Nitrite, UA Negative Negative    Leukocytes, UA Negative Negative     Results for orders placed or performed in visit on 05/23/18   Basic Metabolic Panel   Result Value Ref Range    Sodium 132 (L) 136 - 145 mmol/L    Potassium 3.5 3.5 - 5.0 mmol/L    Chloride 99 98 - 107 mmol/L    CO2 22 22 - 31 mmol/L    Anion Gap, Calculation 11 5 - 18 mmol/L    Glucose 112 70 - 125 mg/dL    Calcium 9.6 8.5 - 10.5 mg/dL    BUN 6 (L) 8 - 22 mg/dL    Creatinine 0.74 0.60 - 1.10 mg/dL    GFR MDRD Af Amer >60 >60 mL/min/1.73m2    GFR MDRD Non Af Amer >60 >60 mL/min/1.73m2         Lab Results   Component Value Date    WBC 3.5 (L) 05/26/2018    HGB 9.6 (L) 05/26/2018    HCT 30.9 (L) 05/26/2018    MCV 85 05/26/2018     05/26/2018       Lab Results   Component Value Date    [id number] 1045 (H) 04/23/2018     Lab Results   Component Value Date    HGBA1C 5.7 03/05/2013     Lab  Results   Component Value Date    INR 1.27 (H) 05/26/2018    INR 1.27 (H) 05/23/2018    INR 1.47 (H) 05/18/2018     No results found for: [id number]  Lab Results   Component Value Date    TSH 0.65 04/23/2018           ASSESSMENT/PLAN:    1. Alcoholic liver cirrhosis, h/o esophageal varices: Follows with Dr. Browning for GI. Paracentesis prn, last paracentesis Friday 5/18 and scheduled for today 5/29. Has been scheduling about every other Friday. Abdomen moderately distended today. On lasix, spirinolactone. Increase daily dose of lasix to 40mg daily. Edema is improved from last visit but weight increasing.   2. Hepatic encephalopathy: A/o today. On lactulose 20gm two times a day. Ammonia level on 5/23-38, continue current dose having multiple stools per day.   3. Hypokalemia: BMP on 5/23-stable, 5/26 was 2.9 and started on 20meq daily x 7 days. Will increase to 40meq as increasing lasix daily dose and no stop date. BMP today and again on 5/31.   4. Hypomagnesemia: On mag ox three times a day, mg level 5/23-1.4, 1.2 on 5/26 will increase to 800mg three times a day.   5. Chronic anemia: HM1 on 5/23-Hg 9 stable.   6. Anxiety and depression: On lexapro, mirtazapine.   7. Insomnia: melatonin at bedtime prn.   8. Malnutrition: Dietary following, weight 135.7-144-149. Weights up no further supplement needed.   9. UTI: On cefuroxime until 5/31.   10. Chronic pain: On gabapentin two times a day.   11. Dependent edema: 1+ ble pitting, improved with lasix additional dose x 4 days. Increased daily dose to 40mg. On spirinolactone. BMP today and again on 5/31. DILLON hose.   12. Tachycardia: Per staff had one episode of tachycardia on Saturday and on call started metoprolol, however has been hold due to low blood pressures and heart rates at times. She has not been tachycardic since and she was anxious when tachycardic on Saturday. Will dc metoprolol.     Per therapy ambulating > 600 feet independent, independent for ADLs. Does not  meet skilled criteria, will plan for discharge on Saturday 6/2. HH PT, OT, MSW.     Electronically signed by: Richa Guadarrama NP    Total 35 minutes of which 75% was spent in counseling and coordination of care of the above plan    Time spent in interview and examination of patient, review of available records, and discussion with nursing staff. Continue care plan, efforts at therapy, and monitor nutritional status.

## 2021-06-18 NOTE — PROGRESS NOTES
Clarified order for albumin replacement with covering GI DrBlaine  Informed Dr. Michael Slater 9L of para fluid removed today,  orders received to give 50g of 25% albumin

## 2021-06-18 NOTE — PROGRESS NOTES
[facility]    DATE: 2018    NAME:  Herminia Dewitt             :  1960  MRN: [id number]  CODE STATUS:  FULL CODE    VISIT TYPE: Problem Visit (labs, discharge placement, ascites)     FACILITY:  [facility] [[id number]]       CHIEF COMPLAIN/REASON FOR VISIT:    Chief Complaint   Patient presents with     Problem Visit     labs, discharge placement, ascites               HISTORY OF PRESENT ILLNESS: Herminia Dewitt is a 57 y.o. female who was admitted - for dizziness, leg cramps, tachycardia. Her K was 2.5 and mg 0.8. Her electrolytes were replaced, held lasix and spirinolactone. Her leg cramps improved as electrolytes normalized. She was noted to have hepatic encephalopathy and lactulose was increased. She had paracentesis on  with 4Liters removed. Restarted spirinolactone and lasix. She was found to have E coli and klebsiella E coli and was treated with ceftriaxone. She has PMH of GI [facility] with esophageal varices, alcoholic liver cirrhosis, hypotension, HLD, anxiety depression, chronic anemia, hyponatremia, DVT. Prior to this she lived at home with  who was recently diagnosed with stage IV stomach cancer.     Today Ms. Dewitt states she had 4 liters drained from her abdomen yesterday and she feels much better today. She says they did the ultrasounds after and there were 2 more liters in there, so they went ahead and scheduled her again for next Tuesday. She says she was talking to her son Jase yesterday and they think she should move to assisted living since her  is also ill and cannot help take care of her. He had his 9th round of chemo yesterday and it is taking a toll on him. She reports she is agreeable to this and thinks it would be good to have more help. She says he has not looked at any facilities yet and would like to talk to someone here about this if possible. She says otherwise she feels pretty  good today and no other concerns. Her legs are not cramping and she feels a little stronger since having that extra fluids drained.       REVIEW OF SYSTEMS:  PROBLEMS AND REVIEW OF SYSTEMS:   Review of Systems  Today on ROS:   Currently, no fever, chills, or rigors. Does not have any visual or hearing problems. Denies any chest pain, headaches, palpitations, lightheadedness, dizziness, shortness of breath, or cough. Appetite is good. Denies any GERD symptoms. Denies any difficulty with swallowing, nausea, or vomiting. Denies any urinary symptoms. No insomnia. No active bleeding. No rash. Positive for abdominal bloating, ascites, diarrhea at times, leg swelling, no cramping today, weakness improving, 4 liters off in paracentesis yesterday      Allergies   Allergen Reactions     Demerol [Meperidine] Itching and Nausea And Vomiting     Morphine Itching and Nausea And Vomiting     Sulfa (Sulfonamide Antibiotics) Itching     Current Outpatient Prescriptions   Medication Sig     cefuroxime (CEFTIN) 500 MG tablet Take 1 tablet (500 mg total) by mouth 2 (two) times a day with meals for 10 days.     escitalopram oxalate (LEXAPRO) 10 MG tablet Take 10 mg by mouth daily.     FOLIC ACID/MULTIVIT-MINERALS (WOMEN'S MULTIVITAMIN GUMMIES ORAL) Take 1 tablet by mouth daily.      furosemide (LASIX) 20 MG tablet Take 1 tablet (20 mg total) by mouth daily. (Patient taking differently: Take 40 mg by mouth daily. )     gabapentin (NEURONTIN) 100 MG capsule Take 100 mg by mouth 2 (two) times a day.     lactulose (ENULOSE) 10 gram/15 mL solution Take 20 g by mouth 2 (two) times a day.     magnesium oxide (MAG-OX) 400 mg tablet Take 1 tablet (400 mg total) by mouth 3 (three) times a day. (Patient taking differently: Take 800 mg by mouth 3 (three) times a day. )     melatonin 3 mg Tab tablet Take 1 tablet (3 mg total) by mouth at bedtime as needed.     mirtazapine (REMERON) 15 MG tablet Take 1 tablet (15 mg total) by mouth at bedtime.      potassium chloride (K-DUR,KLOR-CON) 20 MEQ tablet Take 1 tablet (20 mEq total) by mouth daily for 7 days. (Patient taking differently: Take 40 mEq by mouth daily. )     spironolactone (ALDACTONE) 25 MG tablet Take 1 tablet (25 mg total) by mouth daily.     trolamine salicylate (ASPERCREME) 10 % cream Apply 1 application topically as needed.     Past Medical History:    Past Medical History:   Diagnosis Date     Alcoholic cirrhosis of liver 3/29/2015     Alcoholism      Coagulopathy 3/29/2015     DVT (deep vein thrombosis) in pregnancy 1982     Esophageal varices      GI bleed      Hemochromatosis      History of transfusion      Hyperlipidemia      Hypotension 3/29/2015     Tremor            PHYSICAL EXAMINATION  Vitals:    05/30/18 0700   BP: 118/60   Pulse: 86   Resp: 18   Temp: 98  F (36.7  C)   SpO2: 96%   Weight: 141 lb (64 kg)       Today on physical exam:     GENERAL: Awake, Alert, oriented x3, not in any form of acute distress, answers questions appropriately, follows simple commands, conversant  HEENT: Head is normocephalic with normal hair distribution. No evidence of trauma. Ears: No acute purulent discharge. Eyes: Conjunctivae pink with no scleral jaundice. Nose: Normal mucosa and septum. NECK: Supple with no cervical or supraclavicular lymphadenopathy. Trachea is midline.   CHEST: No tenderness or deformity, no crepitus  LUNG: dim to auscultation with good chest expansion. There are no crackles or wheezes, normal AP diameter.  BACK: No kyphosis of the thoracic spine. Symmetric, no curvature, ROM normal, no CVA tenderness, no spinal tenderness   CVS: There is good S1  S2, regular rhythm, there are no murmurs, rubs, gallops, or heaves,  2+ pulses symmetric in all extremities.  ABDOMEN: Rounded and soft, mildly distended, ascites  EXTREMITIES: 1+ ble pitting pedal edema, Atraumatic. Limited range of motion in ankles.  SKIN: Warm and dry, no erythema noted.  Skin color, texture, no rashes or  lesions.  NEUROLOGICAL: The patient is oriented to person, place and time. Strength and sensation are grossly intact. Face is symmetric.            LABS:   Recent Results (from the past 168 hour(s))   ECG 12 lead nursing unit performed   Result Value Ref Range    SYSTOLIC BLOOD PRESSURE 130 mmHg    DIASTOLIC BLOOD PRESSURE 65 mmHg    VENTRICULAR RATE 72 BPM    ATRIAL RATE 72 BPM    P-R INTERVAL 120 ms    QRS DURATION 74 ms    Q-T INTERVAL 472 ms    QTC CALCULATION (BEZET) 516 ms    P Axis -7 degrees    R AXIS 5 degrees    T AXIS 11 degrees    MUSE DIAGNOSIS       Normal sinus rhythm  Low voltage QRS  Borderline ECG  When compared with ECG of 16-MAY-2018 12:55,  Vent. rate has decreased BY  58 BPM  ST no longer depressed in Lateral leads  Confirmed by JANAY BAJWA MD LOC: ([id number]) on 5/27/2018 1:02:38 PM     Comprehensive metabolic panel   Result Value Ref Range    Sodium 135 (L) 136 - 145 mmol/L    Potassium 2.9 (L) 3.5 - 5.0 mmol/L    Chloride 96 (L) 98 - 107 mmol/L    CO2 28 22 - 31 mmol/L    Anion Gap, Calculation 11 5 - 18 mmol/L    Glucose 86 70 - 125 mg/dL    BUN 7 (L) 8 - 22 mg/dL    Creatinine 0.73 0.60 - 1.10 mg/dL    GFR MDRD Af Amer >60 >60 mL/min/1.73m2    GFR MDRD Non Af Amer >60 >60 mL/min/1.73m2    Bilirubin, Total 1.9 (H) 0.0 - 1.0 mg/dL    Calcium 9.5 8.5 - 10.5 mg/dL    Protein, Total 8.1 (H) 6.0 - 8.0 g/dL    Albumin 3.1 (L) 3.5 - 5.0 g/dL    Alkaline Phosphatase 182 (H) 45 - 120 U/L    AST 41 (H) 0 - 40 U/L    ALT 21 0 - 45 U/L   Lipase   Result Value Ref Range    Lipase 102 (H) 0 - 52 U/L   INR   Result Value Ref Range    INR 1.27 (H) 0.90 - 1.10   APTT   Result Value Ref Range    PTT 39 (H) 24 - 37 seconds   Magnesium   Result Value Ref Range    Magnesium 1.2 (L) 1.8 - 2.6 mg/dL   HM1 (CBC with Diff)   Result Value Ref Range    WBC 3.5 (L) 4.0 - 11.0 thou/uL    RBC 3.64 (L) 3.80 - 5.40 mill/uL    Hemoglobin 9.6 (L) 12.0 - 16.0 g/dL    Hematocrit 30.9 (L) 35.0 - 47.0 %    MCV 85 80 - 100 fL     MCH 26.4 (L) 27.0 - 34.0 pg    MCHC 31.1 (L) 32.0 - 36.0 g/dL    RDW 18.2 (H) 11.0 - 14.5 %    Platelets 150 140 - 440 thou/uL    MPV 9.3 8.5 - 12.5 fL    Neutrophils % 65 50 - 70 %    Lymphocytes % 18 (L) 20 - 40 %    Monocytes % 13 (H) 2 - 10 %    Eosinophils % 3 0 - 6 %    Basophils % 1 0 - 2 %    Neutrophils Absolute 2.3 2.0 - 7.7 thou/uL    Lymphocytes Absolute 0.6 (L) 0.8 - 4.4 thou/uL    Monocytes Absolute 0.5 0.0 - 0.9 thou/uL    Eosinophils Absolute 0.1 0.0 - 0.4 thou/uL    Basophils Absolute 0.0 0.0 - 0.2 thou/uL   Ammonia   Result Value Ref Range    Ammonia 20 11 - 35 umol/L   Lactic Acid   Result Value Ref Range    Lactic Acid 1.4 0.5 - 2.2 mmol/L   Type and screen   Result Value Ref Range    ABORh A POS     Antibody Screen Negative Negative   Urinalysis-UC if Indicated   Result Value Ref Range    Color, UA Yellow Colorless, Yellow, Straw, Light Yellow    Clarity, UA Clear Clear    Glucose, UA Negative Negative    Bilirubin, UA Negative Negative    Ketones, UA Negative Negative    Specific Gravity, UA 1.005 1.001 - 1.030    Blood, UA Negative Negative    pH, UA 7.0 4.5 - 8.0    Protein, UA Negative Negative mg/dL    Urobilinogen, UA <2.0 E.U./dL <2.0 E.U./dL, 2.0 E.U./dL    Nitrite, UA Negative Negative    Leukocytes, UA Negative Negative   Basic Metabolic Panel   Result Value Ref Range    Sodium 134 (L) 136 - 145 mmol/L    Potassium 3.0 (L) 3.5 - 5.0 mmol/L    Chloride 98 98 - 107 mmol/L    CO2 24 22 - 31 mmol/L    Anion Gap, Calculation 12 5 - 18 mmol/L    Glucose 125 70 - 125 mg/dL    Calcium 9.4 8.5 - 10.5 mg/dL    BUN 10 8 - 22 mg/dL    Creatinine 0.82 0.60 - 1.10 mg/dL    GFR MDRD Af Amer >60 >60 mL/min/1.73m2    GFR MDRD Non Af Amer >60 >60 mL/min/1.73m2   Magnesium   Result Value Ref Range    Magnesium 1.4 (L) 1.8 - 2.6 mg/dL     Results for orders placed or performed in visit on 05/29/18   Basic Metabolic Panel   Result Value Ref Range    Sodium 134 (L) 136 - 145 mmol/L    Potassium 3.0 (L) 3.5  - 5.0 mmol/L    Chloride 98 98 - 107 mmol/L    CO2 24 22 - 31 mmol/L    Anion Gap, Calculation 12 5 - 18 mmol/L    Glucose 125 70 - 125 mg/dL    Calcium 9.4 8.5 - 10.5 mg/dL    BUN 10 8 - 22 mg/dL    Creatinine 0.82 0.60 - 1.10 mg/dL    GFR MDRD Af Amer >60 >60 mL/min/1.73m2    GFR MDRD Non Af Amer >60 >60 mL/min/1.73m2         Lab Results   Component Value Date    WBC 3.5 (L) 05/26/2018    HGB 9.6 (L) 05/26/2018    HCT 30.9 (L) 05/26/2018    MCV 85 05/26/2018     05/26/2018       Lab Results   Component Value Date    YQUUFNOS10 1045 (H) 04/23/2018     Lab Results   Component Value Date    HGBA1C 5.7 03/05/2013     Lab Results   Component Value Date    INR 1.27 (H) 05/26/2018    INR 1.27 (H) 05/23/2018    INR 1.47 (H) 05/18/2018     No results found for: [id number]  Lab Results   Component Value Date    TSH 0.65 04/23/2018           ASSESSMENT/PLAN:    1. Alcoholic liver cirrhosis, h/o esophageal varices: Follows with Dr. Browning for GI. Paracentesis prn, last paracentesis 5/29-4liters drained, rescheduled for next Tuesday 6/5. On lasix, spirinolactone. Weight down today 141lbs.   2. Hepatic encephalopathy: A/o today. On lactulose 20gm two times a day. Ammonia level on 5/23-38, continue current dose having multiple stools per day.   3. Hypokalemia: BMP on 5/23-stable, 5/26 was 2.9 and started on 20meq.  BMP 5/29-K 3.0 - increased daily to 40 and additional extra 40meq x 1 today. recheck 5/31.   4. Hypomagnesemia: On mag ox three times a day, mg level 5/23-1.4, 1.2 on 5/26 will increase to 800mg three times a day. MG 1.4, will continue 800mg three times a day and recheck on 5/31.   5. Chronic anemia: HM1 on 5/23-Hg 9 stable.   6. Anxiety and depression: On lexapro, mirtazapine.   7. Insomnia: melatonin at bedtime prn.   8. Malnutrition: Dietary following, weight 135.6-107-133-141. Weights down since fluid removed (4 liters) on paracentesis.   9. UTI: On cefuroxime until 5/31.   10. Chronic pain: On gabapentin  two times a day.   11. Dependent edema: 1+ ble pitting, improved with lasix additional dose x 4 days. Increased daily dose to 40mg. On spirinolactone. BMP today and again on 5/31. DILLON hose.   12. Tachycardia: DC'd metoprolol, one time due to anxiety. No recurrence.     Per therapy ambulating > 600 feet independent, independent for ADLs. Does not meet skilled criteria, will plan for discharge on Saturday 6/2.  PT, OT, MSW. Encouraged patient to discuss KISHORE placement with .     Electronically signed by: Richa Guadarrama NP    Total 25 minutes of which 75% was spent in counseling and coordination of care of the above plan    Time spent in interview and examination of patient, review of available records, and discussion with nursing staff. Continue care plan, efforts at therapy, and monitor nutritional status.

## 2021-06-18 NOTE — PROGRESS NOTES
[facility]    DATE: 2018    NAME:  Herminia Dewitt             :  1960  MRN: [id number]  CODE STATUS:  FULL CODE    VISIT TYPE: Discharge Summary     FACILITY:  [facility] [[id number]]       CHIEF COMPLAIN/REASON FOR VISIT:    Chief Complaint   Patient presents with     Discharge Summary               HISTORY OF PRESENT ILLNESS: Herminia Dewitt is a 57 y.o. female who was admitted - for dizziness, leg cramps, tachycardia. Her K was 2.5 and mg 0.8. Her electrolytes were replaced, held lasix and spirinolactone. Her leg cramps improved as electrolytes normalized. She was noted to have hepatic encephalopathy and lactulose was increased. She had paracentesis on  with 4Liters removed. Restarted spirinolactone and lasix. She was found to have E coli and klebsiella E coli and was treated with ceftriaxone. She has PMH of GI leed with esophageal varices, alcoholic liver cirrhosis, hypotension, HLD, anxiety depression, chronic anemia, hyponatremia, DVT. Prior to this she lived at home with  who was recently diagnosed with stage IV stomach cancer.     TCU course:   Ms. Dewitt was discharged from hospital as high level functioning so had a very short TCU stay. She is ambulating > 600 feet with walker and independent for ADLs. She scored 27 on slums. During her stay her sodium was stable at 132, K 3.5. Her Mg was low still at 1.4 and her mag ox was increased to 800mg two times a day and 400mg at noon. Her Hg was stable at 9.3. Her ammonia was 38 and she continues on lactulose, but due to frequent loose stools her lactulose was not increased at this time. She is alert and oriented. She was scheduled for her next paracentesis on .  She was given 4 days of additional lasix 40mg to help with ascites and pitting lower extremity edema.  ewas recommended to have a f/u BMP and MG at her PCP f/u appt. She will f/u with PCP in 5-  days. She was not recommended to have any services due to her high functioning.       REVIEW OF SYSTEMS:  PROBLEMS AND REVIEW OF SYSTEMS:   Review of Systems  Today on ROS:   Currently, no fever, chills, or rigors. Does not have any visual or hearing problems. Denies any chest pain, headaches, palpitations, lightheadedness, dizziness, shortness of breath, or cough. Appetite is good. Denies any GERD symptoms. Denies any difficulty with swallowing, nausea, or vomiting. Denies any urinary symptoms. No insomnia. No active bleeding. No rash. Positive for abdominal bloating, ascites, diarrhea, lower leg swelling      Allergies   Allergen Reactions     Demerol [Meperidine] Itching and Nausea And Vomiting     Morphine Itching and Nausea And Vomiting     Sulfa (Sulfonamide Antibiotics) Itching     Current Outpatient Prescriptions   Medication Sig     cefuroxime (CEFTIN) 500 MG tablet Take 1 tablet (500 mg total) by mouth 2 (two) times a day with meals for 10 days.     escitalopram oxalate (LEXAPRO) 10 MG tablet Take 10 mg by mouth daily.     FOLIC ACID/MULTIVIT-MINERALS (WOMEN'S MULTIVITAMIN GUMMIES ORAL) Take 1 tablet by mouth daily.      furosemide (LASIX) 20 MG tablet Take 1 tablet (20 mg total) by mouth daily.     gabapentin (NEURONTIN) 100 MG capsule Take 100 mg by mouth 2 (two) times a day.     lactulose (ENULOSE) 10 gram/15 mL solution Take 20 g by mouth 2 (two) times a day.     magnesium oxide (MAG-OX) 400 mg tablet Take 1 tablet (400 mg total) by mouth 3 (three) times a day. (Patient taking differently: Take 800 mg by mouth 2 (two) times a day. And 400mg @ noon)     melatonin 3 mg Tab tablet Take 1 tablet (3 mg total) by mouth at bedtime as needed.     mirtazapine (REMERON) 15 MG tablet Take 1 tablet (15 mg total) by mouth at bedtime.     spironolactone (ALDACTONE) 25 MG tablet Take 1 tablet (25 mg total) by mouth daily.     trolamine salicylate (ASPERCREME) 10 % cream Apply 1 application topically as needed.      Past Medical History:    Past Medical History:   Diagnosis Date     Alcoholic cirrhosis of liver 3/29/2015     Alcoholism      Coagulopathy 3/29/2015     DVT (deep vein thrombosis) in pregnancy 1982     Esophageal varices      GI bleed      Hemochromatosis      History of transfusion      Hyperlipidemia      Hypotension 3/29/2015     Tremor            PHYSICAL EXAMINATION  Vitals:    05/24/18 2007   BP: 139/78   Pulse: (!) 104   Resp: 16   Temp: 97.8  F (36.6  C)   SpO2: 99%   Weight: 141 lb (64 kg)       Today on physical exam:     GENERAL: Awake, Alert, oriented x3, not in any form of acute distress, answers questions appropriately, follows simple commands, conversant  HEENT: Head is normocephalic with normal hair distribution. No evidence of trauma. Ears: No acute purulent discharge. Eyes: Conjunctivae pink with no scleral jaundice. Nose: Normal mucosa and septum. NECK: Supple with no cervical or supraclavicular lymphadenopathy. Trachea is midline.   CHEST: No tenderness or deformity, no crepitus  LUNG: dim to auscultation with good chest expansion. There are no crackles or wheezes, normal AP diameter.  BACK: No kyphosis of the thoracic spine. Symmetric, no curvature, ROM normal, no CVA tenderness, no spinal tenderness   CVS: There is good S1  S2, regular rhythm, there are no murmurs, rubs, gallops, or heaves,  2+ pulses symmetric in all extremities.  ABDOMEN: Rounded and soft, mildly distended, ascites  EXTREMITIES: 2+ ble pitting pedal edema, Atraumatic. Limited range of motion in ankles.  SKIN: Warm and dry, no erythema noted.  Skin color, texture, no rashes or lesions.  NEUROLOGICAL: The patient is oriented to person, place and time. Strength and sensation are grossly intact. Face is symmetric.            LABS:   Recent Results (from the past 168 hour(s))   Basic metabolic panel   Result Value Ref Range    Sodium 131 (L) 136 - 145 mmol/L    Potassium 4.6 3.5 - 5.0 mmol/L    Chloride 98 98 - 107 mmol/L     CO2 26 22 - 31 mmol/L    Anion Gap, Calculation 7 5 - 18 mmol/L    Glucose 108 70 - 125 mg/dL    Calcium 9.0 8.5 - 10.5 mg/dL    BUN 4 (L) 8 - 22 mg/dL    Creatinine 0.70 0.60 - 1.10 mg/dL    GFR MDRD Af Amer >60 >60 mL/min/1.73m2    GFR MDRD Non Af Amer >60 >60 mL/min/1.73m2   Hepatic function panel   Result Value Ref Range    Bilirubin, Total 2.6 (H) 0.0 - 1.0 mg/dL    Bilirubin, Direct 1.3 (H) <=0.5 mg/dL    Protein, Total 6.9 6.0 - 8.0 g/dL    Albumin 2.6 (L) 3.5 - 5.0 g/dL    Alkaline Phosphatase 154 (H) 45 - 120 U/L    AST 42 (H) 0 - 40 U/L    ALT 21 0 - 45 U/L   HM1 (CBC with Diff)   Result Value Ref Range    WBC 4.0 4.0 - 11.0 thou/uL    RBC 3.19 (L) 3.80 - 5.40 mill/uL    Hemoglobin 8.6 (L) 12.0 - 16.0 g/dL    Hematocrit 27.4 (L) 35.0 - 47.0 %    MCV 86 80 - 100 fL    MCH 27.0 27.0 - 34.0 pg    MCHC 31.4 (L) 32.0 - 36.0 g/dL    RDW 17.8 (H) 11.0 - 14.5 %    Platelets 123 (L) 140 - 440 thou/uL    MPV 8.9 8.5 - 12.5 fL    Neutrophils % 65 50 - 70 %    Lymphocytes % 20 20 - 40 %    Monocytes % 12 (H) 2 - 10 %    Eosinophils % 3 0 - 6 %    Basophils % 1 0 - 2 %    Neutrophils Absolute 2.6 2.0 - 7.7 thou/uL    Lymphocytes Absolute 0.8 0.8 - 4.4 thou/uL    Monocytes Absolute 0.5 0.0 - 0.9 thou/uL    Eosinophils Absolute 0.1 0.0 - 0.4 thou/uL    Basophils Absolute 0.0 0.0 - 0.2 thou/uL   Basic metabolic panel   Result Value Ref Range    Sodium 129 (L) 136 - 145 mmol/L    Potassium 4.8 3.5 - 5.0 mmol/L    Chloride 97 (L) 98 - 107 mmol/L    CO2 24 22 - 31 mmol/L    Anion Gap, Calculation 8 5 - 18 mmol/L    Glucose 113 70 - 125 mg/dL    Calcium 9.5 8.5 - 10.5 mg/dL    BUN 5 (L) 8 - 22 mg/dL    Creatinine 0.73 0.60 - 1.10 mg/dL    GFR MDRD Af Amer >60 >60 mL/min/1.73m2    GFR MDRD Non Af Amer >60 >60 mL/min/1.73m2   Hepatic function panel   Result Value Ref Range    Bilirubin, Total 2.7 (H) 0.0 - 1.0 mg/dL    Bilirubin, Direct 1.4 (H) <=0.5 mg/dL    Protein, Total 7.4 6.0 - 8.0 g/dL    Albumin 2.8 (L) 3.5 - 5.0 g/dL     Alkaline Phosphatase 161 (H) 45 - 120 U/L    AST 40 0 - 40 U/L    ALT 22 0 - 45 U/L   Magnesium   Result Value Ref Range    Magnesium 1.5 (L) 1.8 - 2.6 mg/dL   Ammonia   Result Value Ref Range    Ammonia 29 11 - 35 umol/L   Basic metabolic panel   Result Value Ref Range    Sodium 135 (L) 136 - 145 mmol/L    Potassium 3.9 3.5 - 5.0 mmol/L    Chloride 103 98 - 107 mmol/L    CO2 23 22 - 31 mmol/L    Anion Gap, Calculation 9 5 - 18 mmol/L    Glucose 103 70 - 125 mg/dL    Calcium 9.1 8.5 - 10.5 mg/dL    BUN 5 (L) 8 - 22 mg/dL    Creatinine 0.70 0.60 - 1.10 mg/dL    GFR MDRD Af Amer >60 >60 mL/min/1.73m2    GFR MDRD Non Af Amer >60 >60 mL/min/1.73m2   Hepatic function panel   Result Value Ref Range    Bilirubin, Total 2.5 (H) 0.0 - 1.0 mg/dL    Bilirubin, Direct 1.3 (H) <=0.5 mg/dL    Protein, Total 6.9 6.0 - 8.0 g/dL    Albumin 2.6 (L) 3.5 - 5.0 g/dL    Alkaline Phosphatase 160 (H) 45 - 120 U/L    AST 33 0 - 40 U/L    ALT 20 0 - 45 U/L   HM2(CBC W/O DIFF)   Result Value Ref Range    WBC 3.9 (L) 4.0 - 11.0 thou/uL    RBC 3.28 (L) 3.80 - 5.40 mill/uL    Hemoglobin 8.8 (L) 12.0 - 16.0 g/dL    Hematocrit 28.1 (L) 35.0 - 47.0 %    MCV 86 80 - 100 fL    MCH 26.8 (L) 27.0 - 34.0 pg    MCHC 31.3 (L) 32.0 - 36.0 g/dL    RDW 18.0 (H) 11.0 - 14.5 %    Platelets 135 (L) 140 - 440 thou/uL    MPV 8.9 8.5 - 12.5 fL   Basic Metabolic Panel   Result Value Ref Range    Sodium 132 (L) 136 - 145 mmol/L    Potassium 3.5 3.5 - 5.0 mmol/L    Chloride 99 98 - 107 mmol/L    CO2 22 22 - 31 mmol/L    Anion Gap, Calculation 11 5 - 18 mmol/L    Glucose 112 70 - 125 mg/dL    Calcium 9.6 8.5 - 10.5 mg/dL    BUN 6 (L) 8 - 22 mg/dL    Creatinine 0.74 0.60 - 1.10 mg/dL    GFR MDRD Af Amer >60 >60 mL/min/1.73m2    GFR MDRD Non Af Amer >60 >60 mL/min/1.73m2   Hepatic Profile   Result Value Ref Range    Bilirubin, Total 2.8 (H) 0.0 - 1.0 mg/dL    Bilirubin, Direct 1.4 (H) <=0.5 mg/dL    Protein, Total 7.4 6.0 - 8.0 g/dL    Albumin 2.9 (L) 3.5 - 5.0 g/dL     Alkaline Phosphatase 179 (H) 45 - 120 U/L    AST 39 0 - 40 U/L    ALT 19 0 - 45 U/L   Magnesium   Result Value Ref Range    Magnesium 1.4 (L) 1.8 - 2.6 mg/dL   HM2(CBC w/o Differential)   Result Value Ref Range    WBC 3.9 (L) 4.0 - 11.0 thou/uL    RBC 3.49 (L) 3.80 - 5.40 mill/uL    Hemoglobin 9.3 (L) 12.0 - 16.0 g/dL    Hematocrit 30.7 (L) 35.0 - 47.0 %    MCV 88 80 - 100 fL    MCH 26.6 (L) 27.0 - 34.0 pg    MCHC 30.3 (L) 32.0 - 36.0 g/dL    RDW 18.3 (H) 11.0 - 14.5 %    Platelets 133 (L) 140 - 440 thou/uL    MPV 9.3 8.5 - 12.5 fL   INR   Result Value Ref Range    INR 1.27 (H) 0.90 - 1.10   Ammonia   Result Value Ref Range    Ammonia 38 (H) 11 - 35 umol/L     Results for orders placed or performed in visit on 05/23/18   Basic Metabolic Panel   Result Value Ref Range    Sodium 132 (L) 136 - 145 mmol/L    Potassium 3.5 3.5 - 5.0 mmol/L    Chloride 99 98 - 107 mmol/L    CO2 22 22 - 31 mmol/L    Anion Gap, Calculation 11 5 - 18 mmol/L    Glucose 112 70 - 125 mg/dL    Calcium 9.6 8.5 - 10.5 mg/dL    BUN 6 (L) 8 - 22 mg/dL    Creatinine 0.74 0.60 - 1.10 mg/dL    GFR MDRD Af Amer >60 >60 mL/min/1.73m2    GFR MDRD Non Af Amer >60 >60 mL/min/1.73m2         Lab Results   Component Value Date    WBC 3.9 (L) 05/23/2018    HGB 9.3 (L) 05/23/2018    HCT 30.7 (L) 05/23/2018    MCV 88 05/23/2018     (L) 05/23/2018       Lab Results   Component Value Date    RAKHEWIB11 1045 (H) 04/23/2018     Lab Results   Component Value Date    HGBA1C 5.7 03/05/2013     Lab Results   Component Value Date    INR 1.27 (H) 05/23/2018    INR 1.47 (H) 05/18/2018    INR 1.40 (H) 05/17/2018     No results found for: [id number]  Lab Results   Component Value Date    TSH 0.65 04/23/2018           ASSESSMENT/PLAN:    1. Alcoholic liver cirrhosis, h/o esophageal varices: Follows with Dr. Browning for GI. Paracentesis prn, had one last Friday 5/18. Has been scheduling about every other Friday but requesting an additional paracentesis this week. Was  scheduled for 5/29. Abdomen mildly distended. On lasix, spirinolactone. Increased lasix x 4 days due to ascites and edema.   2. Hepatic encephalopathy: A/o today. On lactulose 20gm two times a day. Ammonia level on 5/23-38, continue current dose having multiple stools per day.   3. Hypokalemia: BMP on 5/23-stable. No supplement currently. K 3.5. Stable.  4. Hypomagnesemia: On mag ox three times a day, mg level 5/23-still low 1.4, increased to 800mg two times a day and 400mg qnoon. .   5. Chronic anemia: HM1 on 5/23-Hg 9 stable.   6. Anxiety and depression: On lexapro, mirtazapine.   7. Insomnia: melatonin at bedtime prn.   8. Malnutrition: Dietary following, weight 135.7lbs.   9. UTI: On cefuroxime until 5/31.   10. Chronic pain: On gabapentin two times a day.   11. Dependent edema: 2+ ble pitting, lasix 40mg additional x 4 days. Recommend bmp with f/u with PCP.     Electronically signed by: Richa Guadarrama NP    Total 35 minutes of which 75% was spent in counseling and coordination of care of the above plan    Time spent in interview and examination of patient, review of available records, and discussion with nursing staff. Continue care plan, efforts at therapy, and monitor nutritional status.

## 2021-06-18 NOTE — PROGRESS NOTES
"            [facility]    DATE: 2018    NAME:  Herminia Dewitt             :  1960  MRN: [id number]  CODE STATUS:  FULL CODE    VISIT TYPE: Discharge Summary     FACILITY:  [facility] [[id number]]       CHIEF COMPLAIN/REASON FOR VISIT:    Chief Complaint   Patient presents with     Discharge Summary               HISTORY OF PRESENT ILLNESS: Herminia Dewitt is a 57 y.o. female who was admitted - for dizziness, leg cramps, tachycardia. Her K was 2.5 and mg 0.8. Her electrolytes were replaced, held lasix and spirinolactone. Her leg cramps improved as electrolytes normalized. She was noted to have hepatic encephalopathy and lactulose was increased. She had paracentesis on  with 4Liters removed. Restarted spirinolactone and lasix. She was found to have E coli and klebsiella E coli and was treated with ceftriaxone. She has PMH of GI leed with esophageal varices, alcoholic liver cirrhosis, hypotension, HLD, anxiety depression, chronic anemia, hyponatremia, DVT. Prior to this she lived at home with  who was recently diagnosed with stage IV stomach cancer.     TCU course:   Ms. Dewitt has made great progress with therapy and has been ambulating >600 feet independently with her walker. She is independent for ADLs. She was supposed to discharge on  but was extended due to ED visit for worsening electrolytes and anxiety. Her family was concerned about her returning home as her  is ill and unable to help her and she is unable to help him. Herminia has expressed concerns that her family feels she is \"a burden\". She did have another paracentesis on  with 4 Liters removed. She is scheduled next for . Her potassium and Magnesium was titrated this past week but overall she is very medically stable and does not meet criteria for skilled care. Family was upset with new LCD of . They requested referrals for Assisted living " v long term care since she is unable to remain in TCU. They even requested referrals to memory care but it was explained to them that she is too high level functioning for long term care and does not meet memory care criteria. Referrals were sent to Assisted living and she was established with a , however this transfer will take time. Family was educated she would still need to return home in the meantime. They are concerned that she will resume drinking when at home, discussed with them that she cannot be stopped and is able to make her own decisions. Encouraged family to remove alcohol from the home and encourage her to abstain. She will be returning home on 6/2 with HH PT, MSW. She will f/u with PCP in 5-7 days and will need repeat BMP, MG at f/u.       REVIEW OF SYSTEMS:  PROBLEMS AND REVIEW OF SYSTEMS:   Review of Systems  Today on ROS:   Currently, no fever, chills, or rigors. Does not have any visual or hearing problems. Denies any chest pain, headaches, palpitations, lightheadedness, dizziness, shortness of breath, or cough. Appetite is good. Denies any GERD symptoms. Denies any difficulty with swallowing, nausea, or vomiting. Denies any urinary symptoms. No insomnia. No active bleeding. No rash. Positive for abdominal bloating, ascites, diarrhea at times, leg swelling, no cramping today      Allergies   Allergen Reactions     Demerol [Meperidine] Itching and Nausea And Vomiting     Morphine Itching and Nausea And Vomiting     Sulfa (Sulfonamide Antibiotics) Itching     Current Outpatient Prescriptions   Medication Sig     escitalopram oxalate (LEXAPRO) 10 MG tablet Take 10 mg by mouth daily.     FOLIC ACID/MULTIVIT-MINERALS (WOMEN'S MULTIVITAMIN GUMMIES ORAL) Take 1 tablet by mouth daily.      furosemide (LASIX) 20 MG tablet Take 1 tablet (20 mg total) by mouth daily. (Patient taking differently: Take 40 mg by mouth daily. )     gabapentin (NEURONTIN) 100 MG capsule Take 100 mg by mouth 2  (two) times a day.     lactulose (ENULOSE) 10 gram/15 mL solution Take 20 g by mouth 2 (two) times a day.     magnesium oxide (MAG-OX) 400 mg tablet Take 1 tablet (400 mg total) by mouth 3 (three) times a day. (Patient taking differently: Take 800 mg by mouth 3 (three) times a day. )     melatonin 3 mg Tab tablet Take 1 tablet (3 mg total) by mouth at bedtime as needed.     mirtazapine (REMERON) 15 MG tablet Take 1 tablet (15 mg total) by mouth at bedtime.     potassium chloride (K-DUR,KLOR-CON) 20 MEQ tablet Take 1 tablet (20 mEq total) by mouth daily for 7 days. (Patient taking differently: Take 40 mEq by mouth daily. )     spironolactone (ALDACTONE) 25 MG tablet Take 1 tablet (25 mg total) by mouth daily.     trolamine salicylate (ASPERCREME) 10 % cream Apply 1 application topically as needed.     Past Medical History:    Past Medical History:   Diagnosis Date     Alcoholic cirrhosis of liver 3/29/2015     Alcoholism      Coagulopathy 3/29/2015     DVT (deep vein thrombosis) in pregnancy 1982     Esophageal varices      GI bleed      Hemochromatosis      History of transfusion      Hyperlipidemia      Hypotension 3/29/2015     Tremor            PHYSICAL EXAMINATION  Vitals:    05/31/18 2043   BP: 134/73   Pulse: (!) 102   Resp: 18   Temp: 98.6  F (37  C)   SpO2: 98%   Weight: 145 lb (65.8 kg)       Today on physical exam:     GENERAL: Awake, Alert, oriented x3, not in any form of acute distress, answers questions appropriately, follows simple commands, conversant  HEENT: Head is normocephalic with normal hair distribution. No evidence of trauma. Ears: No acute purulent discharge. Eyes: Conjunctivae pink with no scleral jaundice. Nose: Normal mucosa and septum. NECK: Supple with no cervical or supraclavicular lymphadenopathy. Trachea is midline.   CHEST: No tenderness or deformity, no crepitus  LUNG: dim to auscultation with good chest expansion. There are no crackles or wheezes, normal AP diameter.  BACK: No  kyphosis of the thoracic spine. Symmetric, no curvature, ROM normal, no CVA tenderness, no spinal tenderness   CVS: There is good S1  S2, regular rhythm, there are no murmurs, rubs, gallops, or heaves,  2+ pulses symmetric in all extremities.  ABDOMEN: Rounded and soft, mildly distended, ascites  EXTREMITIES: 1-2+ ble pitting pedal edema, Atraumatic. Limited range of motion in ankles.  SKIN: Warm and dry, no erythema noted.  Skin color, texture, no rashes or lesions.  NEUROLOGICAL: The patient is oriented to person, place and time. Strength and sensation are grossly intact. Face is symmetric.            LABS:   Recent Results (from the past 168 hour(s))   ECG 12 lead nursing unit performed   Result Value Ref Range    SYSTOLIC BLOOD PRESSURE 130 mmHg    DIASTOLIC BLOOD PRESSURE 65 mmHg    VENTRICULAR RATE 72 BPM    ATRIAL RATE 72 BPM    P-R INTERVAL 120 ms    QRS DURATION 74 ms    Q-T INTERVAL 472 ms    QTC CALCULATION (BEZET) 516 ms    P Axis -7 degrees    R AXIS 5 degrees    T AXIS 11 degrees    MUSE DIAGNOSIS       Normal sinus rhythm  Low voltage QRS  Borderline ECG  When compared with ECG of 16-MAY-2018 12:55,  Vent. rate has decreased BY  58 BPM  ST no longer depressed in Lateral leads  Confirmed by JANAY BAJWA MD LOC: ([id number]) on 5/27/2018 1:02:38 PM     Comprehensive metabolic panel   Result Value Ref Range    Sodium 135 (L) 136 - 145 mmol/L    Potassium 2.9 (L) 3.5 - 5.0 mmol/L    Chloride 96 (L) 98 - 107 mmol/L    CO2 28 22 - 31 mmol/L    Anion Gap, Calculation 11 5 - 18 mmol/L    Glucose 86 70 - 125 mg/dL    BUN 7 (L) 8 - 22 mg/dL    Creatinine 0.73 0.60 - 1.10 mg/dL    GFR MDRD Af Amer >60 >60 mL/min/1.73m2    GFR MDRD Non Af Amer >60 >60 mL/min/1.73m2    Bilirubin, Total 1.9 (H) 0.0 - 1.0 mg/dL    Calcium 9.5 8.5 - 10.5 mg/dL    Protein, Total 8.1 (H) 6.0 - 8.0 g/dL    Albumin 3.1 (L) 3.5 - 5.0 g/dL    Alkaline Phosphatase 182 (H) 45 - 120 U/L    AST 41 (H) 0 - 40 U/L    ALT 21 0 - 45 U/L   Lipase    Result Value Ref Range    Lipase 102 (H) 0 - 52 U/L   INR   Result Value Ref Range    INR 1.27 (H) 0.90 - 1.10   APTT   Result Value Ref Range    PTT 39 (H) 24 - 37 seconds   Magnesium   Result Value Ref Range    Magnesium 1.2 (L) 1.8 - 2.6 mg/dL   HM1 (CBC with Diff)   Result Value Ref Range    WBC 3.5 (L) 4.0 - 11.0 thou/uL    RBC 3.64 (L) 3.80 - 5.40 mill/uL    Hemoglobin 9.6 (L) 12.0 - 16.0 g/dL    Hematocrit 30.9 (L) 35.0 - 47.0 %    MCV 85 80 - 100 fL    MCH 26.4 (L) 27.0 - 34.0 pg    MCHC 31.1 (L) 32.0 - 36.0 g/dL    RDW 18.2 (H) 11.0 - 14.5 %    Platelets 150 140 - 440 thou/uL    MPV 9.3 8.5 - 12.5 fL    Neutrophils % 65 50 - 70 %    Lymphocytes % 18 (L) 20 - 40 %    Monocytes % 13 (H) 2 - 10 %    Eosinophils % 3 0 - 6 %    Basophils % 1 0 - 2 %    Neutrophils Absolute 2.3 2.0 - 7.7 thou/uL    Lymphocytes Absolute 0.6 (L) 0.8 - 4.4 thou/uL    Monocytes Absolute 0.5 0.0 - 0.9 thou/uL    Eosinophils Absolute 0.1 0.0 - 0.4 thou/uL    Basophils Absolute 0.0 0.0 - 0.2 thou/uL   Ammonia   Result Value Ref Range    Ammonia 20 11 - 35 umol/L   Lactic Acid   Result Value Ref Range    Lactic Acid 1.4 0.5 - 2.2 mmol/L   Type and screen   Result Value Ref Range    ABORh A POS     Antibody Screen Negative Negative   Blood Culture 1st   Result Value Ref Range    Anaerobic Blood Culture Bottle No Growth No Growth, No organisms seen, bottle returned to instrument, Specimen not received    Aerobic Blood Culture Bottle No Growth No Growth, No organisms seen, bottle returned to instrument, Specimen not received   Blood Culture 2nd   Result Value Ref Range    Anaerobic Blood Culture Bottle No Growth No Growth, No organisms seen, bottle returned to instrument, Specimen not received    Aerobic Blood Culture Bottle No Growth No Growth, No organisms seen, bottle returned to instrument, Specimen not received   Urinalysis-UC if Indicated   Result Value Ref Range    Color, UA Yellow Colorless, Yellow, Straw, Light Yellow    Clarity,  UA Clear Clear    Glucose, UA Negative Negative    Bilirubin, UA Negative Negative    Ketones, UA Negative Negative    Specific Gravity, UA 1.005 1.001 - 1.030    Blood, UA Negative Negative    pH, UA 7.0 4.5 - 8.0    Protein, UA Negative Negative mg/dL    Urobilinogen, UA <2.0 E.U./dL <2.0 E.U./dL, 2.0 E.U./dL    Nitrite, UA Negative Negative    Leukocytes, UA Negative Negative   Basic Metabolic Panel   Result Value Ref Range    Sodium 134 (L) 136 - 145 mmol/L    Potassium 3.0 (L) 3.5 - 5.0 mmol/L    Chloride 98 98 - 107 mmol/L    CO2 24 22 - 31 mmol/L    Anion Gap, Calculation 12 5 - 18 mmol/L    Glucose 125 70 - 125 mg/dL    Calcium 9.4 8.5 - 10.5 mg/dL    BUN 10 8 - 22 mg/dL    Creatinine 0.82 0.60 - 1.10 mg/dL    GFR MDRD Af Amer >60 >60 mL/min/1.73m2    GFR MDRD Non Af Amer >60 >60 mL/min/1.73m2   Magnesium   Result Value Ref Range    Magnesium 1.4 (L) 1.8 - 2.6 mg/dL   Basic Metabolic Panel   Result Value Ref Range    Sodium 137 136 - 145 mmol/L    Potassium 3.3 (L) 3.5 - 5.0 mmol/L    Chloride 102 98 - 107 mmol/L    CO2 24 22 - 31 mmol/L    Anion Gap, Calculation 11 5 - 18 mmol/L    Glucose 101 70 - 125 mg/dL    Calcium 9.6 8.5 - 10.5 mg/dL    BUN 7 (L) 8 - 22 mg/dL    Creatinine 0.75 0.60 - 1.10 mg/dL    GFR MDRD Af Amer >60 >60 mL/min/1.73m2    GFR MDRD Non Af Amer >60 >60 mL/min/1.73m2   Magnesium   Result Value Ref Range    Magnesium 1.3 (L) 1.8 - 2.6 mg/dL     Results for orders placed or performed in visit on 06/01/18   Basic Metabolic Panel   Result Value Ref Range    Sodium 137 136 - 145 mmol/L    Potassium 3.3 (L) 3.5 - 5.0 mmol/L    Chloride 102 98 - 107 mmol/L    CO2 24 22 - 31 mmol/L    Anion Gap, Calculation 11 5 - 18 mmol/L    Glucose 101 70 - 125 mg/dL    Calcium 9.6 8.5 - 10.5 mg/dL    BUN 7 (L) 8 - 22 mg/dL    Creatinine 0.75 0.60 - 1.10 mg/dL    GFR MDRD Af Amer >60 >60 mL/min/1.73m2    GFR MDRD Non Af Amer >60 >60 mL/min/1.73m2         Lab Results   Component Value Date    WBC 3.5 (L)  05/26/2018    HGB 9.6 (L) 05/26/2018    HCT 30.9 (L) 05/26/2018    MCV 85 05/26/2018     05/26/2018       Lab Results   Component Value Date    AVGANMMR99 1045 (H) 04/23/2018     Lab Results   Component Value Date    HGBA1C 5.7 03/05/2013     Lab Results   Component Value Date    INR 1.27 (H) 05/26/2018    INR 1.27 (H) 05/23/2018    INR 1.47 (H) 05/18/2018     No results found for: [id number]  Lab Results   Component Value Date    TSH 0.65 04/23/2018           ASSESSMENT/PLAN:    1. Alcoholic liver cirrhosis, h/o esophageal varices: Follows with Dr. Browning for GI. Paracentesis prn, last paracentesis 5/29-4liters drained, rescheduled for next Tuesday 6/5. On lasix, spirinolactone. Weight up again - 145lbs, given extra dose of lasix x 1 today.   2. Hepatic encephalopathy: A/o today. On lactulose 20gm two times a day. Ammonia level on 5/23-38, continue current dose having multiple stools per day.   3. Hypokalemia: BMP on 5/23-stable, 5/26 was 2.9 and started on 20meq.  BMP 5/29-K 3.0 - increased daily to 40 and additional extra 40meq x 1 today. recheck 5/31-improved to 3.3.   4. Hypomagnesemia: On mag ox three times a day, mg level 5/23-1.4, 1.2 on 5/26 will increase to 800mg three times a day. MG 1.4, will continue 800mg three times a day and recheck on 5/31-1.3.   5. Chronic anemia: HM1 on 5/23-Hg 9 stable.   6. Anxiety and depression: On lexapro, mirtazapine.   7. Insomnia: melatonin at bedtime prn.   8. Malnutrition: Dietary following, weight [phone]. Weight down after paracentesis and now increasing.   9. UTI: Completed cefuroxime on 5/31.   10. Chronic pain: On gabapentin two times a day.   11. Dependent edema: 1-2+ ble pitting, one more additional dose of lasix today. Increased daily dose to 40mg. On spirinolactone. DILLON hose.     Electronically signed by: Richa Guadarrama NP    Total 35 minutes of which 75% was spent in counseling and coordination of care of the above plan    Time spent in  "interview and examination of patient, review of available records, and discussion with nursing staff. Continue care plan, efforts at therapy, and monitor nutritional status.      Please evaluate Herminia Dewitt for admission to Home Health.    Face to Face Attestation and Initial Plan of Care    The face-to-face encounter occurred on date: 6/1/18  Face to Face encounter was with: Richa Guadarrama    Please provide brief clinical summary of reason for visit and need for home care. Deconditioning after hospital stay for weakness, electrolyte imbalance    Please identify which of the following home health disciplines the patient will need AND describe the skilled services that you would like the home health agency to perform: PHYSICAL THERAPY: strength training and gait training and MEDICAL SOCIAL WORK    Homebound Status (describe the functional limitations that support this patient is confined to his/her home. Medicaid recipients are not required to be homebound.):assistive device needed:  2WW    Name of physician who will be responsible for the ongoing home health plan of care (CMS requires the referring physician to provide the specific name of the community physician instead of a title, such as \"PCP\"): Altagracia Hurtado MD    Requested Start of Care Date: Within 48 hours    Other information to assist the home health agency in developing the initial Plan of Care:    I certify that services are/were furnished while this patient was under the care of a physician and that a physician or an allowed non-physician practitioner (NPP), had a face-to-face encounter that meets the physician face-to-face encounter requirements. The encounter was in whole, or in part, related to the primary reason for home health. The patient is confined to his/her home and needs intermittent skilled nursing, physical therapy, speech-language pathology, or the continued need for occupational therapy. A plan of care has been established by a " physician and is periodically reviewed by a physician.

## 2021-06-18 NOTE — PROGRESS NOTES
[facility]    DATE: 2018    NAME:  Herminia Dewitt             :  1960  MRN: [id number]  CODE STATUS:  FULL CODE    VISIT TYPE: Problem Visit (hospital f/u)     FACILITY:  [facility] [[id number]]       CHIEF COMPLAIN/REASON FOR VISIT:    Chief Complaint   Patient presents with     Problem Visit     hospital f/u               HISTORY OF PRESENT ILLNESS: Herminia Dewitt is a 57 y.o. female who was admitted - for dizziness, leg cramps, tachycardia. Her K was 2.5 and mg 0.8. Her electrolytes were replaced, held lasix and spirinolactone. Her leg cramps improved as electrolytes normalized. She was noted to have hepatic encephalopathy and lactulose was increased. She had paracentesis on  with 4Liters removed. Restarted spirinolactone and lasix. She was found to have E coli and klebsiella E coli and was treated with ceftriaxone. She has PMH of [facility] with esophageal varices, alcoholic liver cirrhosis, hypotension, HLD, anxiety depression, chronic anemia, hyponatremia, DVT. Prior to this she lived at home with  who was recently diagnosed with stage IV stomach cancer.     Today Ms. Dewitt states she feels great today. She seems energetic and has a big smile on her face. She says she has been washed up and dressed, slept well, and just had a great big breakfast so she is ready for the day. She plans to wow the therapist so she can go home very soon. She says she is not having any pain and no breathing issues. She has no cough and does not feel foggy anymore. She feels she is very clear headed now. She takes the lactulose so at times that can hit her real quick and she will have to rush to the bathroom. She understands why it is important for her to take this medication so she doesn't mind. She says her legs are not swollen but her abdomen is more distended again. She denies any further muscle cramping. She just had a  paracentesis last Friday but she thinks she needs another already. She thinks they drained 4 liters on Friday. She says she is walking very well but is concerned that her range of motion is limited in her ankles and wants to work on this. She has not other concerns and just wondering what her schedule for the day is. Per staff no complaints of pain, loose stools with lactulose.     REVIEW OF SYSTEMS:  PROBLEMS AND REVIEW OF SYSTEMS:   Review of Systems  Today on ROS:   Currently, no fever, chills, or rigors. Does not have any visual or hearing problems. Denies any chest pain, headaches, palpitations, lightheadedness, dizziness, shortness of breath, or cough. Appetite is good. Denies any GERD symptoms. Denies any difficulty with swallowing, nausea, or vomiting. Denies any urinary symptoms. No insomnia. No active bleeding. No rash. Positive for abdominal bloating, ascites, diarrhea      Allergies   Allergen Reactions     Demerol [Meperidine] Itching and Nausea And Vomiting     Morphine Itching and Nausea And Vomiting     Sulfa (Sulfonamide Antibiotics) Itching     Current Outpatient Prescriptions   Medication Sig     cefuroxime (CEFTIN) 500 MG tablet Take 1 tablet (500 mg total) by mouth 2 (two) times a day with meals for 10 days.     escitalopram oxalate (LEXAPRO) 10 MG tablet Take 10 mg by mouth daily.     FOLIC ACID/MULTIVIT-MINERALS (WOMEN'S MULTIVITAMIN GUMMIES ORAL) Take 1 tablet by mouth daily.      furosemide (LASIX) 20 MG tablet Take 1 tablet (20 mg total) by mouth daily.     gabapentin (NEURONTIN) 100 MG capsule Take 100 mg by mouth 2 (two) times a day.     lactulose (ENULOSE) 10 gram/15 mL solution Take 20 g by mouth 2 (two) times a day.     magnesium oxide (MAG-OX) 400 mg tablet Take 1 tablet (400 mg total) by mouth 3 (three) times a day.     melatonin 3 mg Tab tablet Take 1 tablet (3 mg total) by mouth at bedtime as needed.     mirtazapine (REMERON) 15 MG tablet Take 1 tablet (15 mg total) by mouth at  bedtime.     mirtazapine (REMERON) 15 MG tablet Take 1 tablet (15 mg total) by mouth at bedtime as needed (sleep).     senna (SENOKOT) 8.6 mg tablet Take 1 tablet by mouth daily as needed for constipation.     spironolactone (ALDACTONE) 25 MG tablet Take 1 tablet (25 mg total) by mouth daily.     trolamine salicylate (ASPERCREME) 10 % cream Apply 1 application topically as needed.     Past Medical History:    Past Medical History:   Diagnosis Date     Alcoholic cirrhosis of liver 3/29/2015     Alcoholism      Coagulopathy 3/29/2015     DVT (deep vein thrombosis) in pregnancy 1982     Esophageal varices      GI bleed      Hemochromatosis      History of transfusion      Hyperlipidemia      Hypotension 3/29/2015     Tremor            PHYSICAL EXAMINATION  Vitals:    05/21/18 2159   BP: 126/60   Pulse: 89   Resp: 16   Temp: 97.7  F (36.5  C)   SpO2: 95%   Weight: 135 lb (61.2 kg)       Today on physical exam:     GENERAL: Awake, Alert, oriented x3, not in any form of acute distress, answers questions appropriately, follows simple commands, conversant  HEENT: Head is normocephalic with normal hair distribution. No evidence of trauma. Ears: No acute purulent discharge. Eyes: Conjunctivae pink with no scleral jaundice. Nose: Normal mucosa and septum. NECK: Supple with no cervical or supraclavicular lymphadenopathy. Trachea is midline.   CHEST: No tenderness or deformity, no crepitus  LUNG: dim to auscultation with good chest expansion. There are no crackles or wheezes, normal AP diameter.  BACK: No kyphosis of the thoracic spine. Symmetric, no curvature, ROM normal, no CVA tenderness, no spinal tenderness   CVS: There is good S1  S2, regular rhythm, there are no murmurs, rubs, gallops, or heaves,  2+ pulses symmetric in all extremities.  ABDOMEN: Rounded and soft, mildly distended, ascites  EXTREMITIES: No pedal edema, Atraumatic. Limited range of motion in ankles.  SKIN: Warm and dry, no erythema noted.  Skin color,  texture, no rashes or lesions.  NEUROLOGICAL: The patient is oriented to person, place and time. Strength and sensation are grossly intact. Face is symmetric.            LABS:   Recent Results (from the past 168 hour(s))   Ammonia   Result Value Ref Range    Ammonia 42 (H) 11 - 35 umol/L   Comprehensive Metabolic Panel   Result Value Ref Range    Sodium 135 (L) 136 - 145 mmol/L    Potassium 2.5 (LL) 3.5 - 5.0 mmol/L    Chloride 88 (L) 98 - 107 mmol/L    CO2 30 22 - 31 mmol/L    Anion Gap, Calculation 17 5 - 18 mmol/L    Glucose 125 70 - 125 mg/dL    BUN 5 (L) 8 - 22 mg/dL    Creatinine 0.68 0.60 - 1.10 mg/dL    GFR MDRD Af Amer >60 >60 mL/min/1.73m2    GFR MDRD Non Af Amer >60 >60 mL/min/1.73m2    Bilirubin, Total 2.5 (H) 0.0 - 1.0 mg/dL    Calcium 9.8 8.5 - 10.5 mg/dL    Protein, Total 8.2 (H) 6.0 - 8.0 g/dL    Albumin 3.0 (L) 3.5 - 5.0 g/dL    Alkaline Phosphatase 171 (H) 45 - 120 U/L    AST 52 (H) 0 - 40 U/L    ALT 24 0 - 45 U/L   Magnesium   Result Value Ref Range    Magnesium 0.8 (LL) 1.8 - 2.6 mg/dL   INR   Result Value Ref Range    INR 1.29 (H) 0.90 - 1.10   HM1 (CBC with Diff)   Result Value Ref Range    WBC 4.5 4.0 - 11.0 thou/uL    RBC 3.81 3.80 - 5.40 mill/uL    Hemoglobin 10.0 (L) 12.0 - 16.0 g/dL    Hematocrit 32.2 (L) 35.0 - 47.0 %    MCV 85 80 - 100 fL    MCH 26.2 (L) 27.0 - 34.0 pg    MCHC 31.1 (L) 32.0 - 36.0 g/dL    RDW 17.2 (H) 11.0 - 14.5 %    Platelets 149 140 - 440 thou/uL    MPV 8.6 8.5 - 12.5 fL    Neutrophils % 71 (H) 50 - 70 %    Lymphocytes % 14 (L) 20 - 40 %    Monocytes % 13 (H) 2 - 10 %    Eosinophils % 1 0 - 6 %    Basophils % 1 0 - 2 %    Neutrophils Absolute 3.2 2.0 - 7.7 thou/uL    Lymphocytes Absolute 0.6 (L) 0.8 - 4.4 thou/uL    Monocytes Absolute 0.6 0.0 - 0.9 thou/uL    Eosinophils Absolute 0.0 0.0 - 0.4 thou/uL    Basophils Absolute 0.1 0.0 - 0.2 thou/uL   ECG 12 lead nursing unit performed   Result Value Ref Range    SYSTOLIC BLOOD PRESSURE  mmHg    DIASTOLIC BLOOD PRESSURE   mmHg    VENTRICULAR RATE 111 BPM    ATRIAL RATE 111 BPM    P-R INTERVAL 128 ms    QRS DURATION 90 ms    Q-T INTERVAL 298 ms    QTC CALCULATION (BEZET) 405 ms    P Axis 43 degrees    R AXIS 26 degrees    T AXIS 87 degrees    MUSE DIAGNOSIS       Sinus tachycardia with frequent and consecutive Premature ventricular complexes  Cannot rule out Anterior infarct (cited on or before 29-MAR-2018)  Abnormal ECG  When compared with ECG of 29-MAR-2018 10:47,  Premature ventricular complexes are now Present  Nonspecific T wave abnormality, worse in Anterolateral leads  Confirmed by FIDELINA LANDRUM MD LOC:JAY [id number]) on 5/16/2018 5:12:11 PM     Urinalysis-UC if Indicated   Result Value Ref Range    Color, UA Yellow Colorless, Yellow, Straw, Light Yellow    Clarity, UA Clear Clear    Glucose, UA Negative Negative    Bilirubin, UA Negative Negative    Ketones, UA Negative Negative    Specific Gravity, UA 1.010 1.001 - 1.030    Blood, UA Negative Negative    pH, UA 8.0 4.5 - 8.0    Protein, UA Negative Negative mg/dL    Urobilinogen, UA <2.0 E.U./dL <2.0 E.U./dL, 2.0 E.U./dL    Nitrite, UA Negative Negative    Leukocytes, UA Large (!) Negative    Bacteria, UA None Seen None Seen hpf    RBC, UA 0-2 None Seen, 0-2 hpf    WBC, UA 0-5 None Seen, 0-5 hpf    Squam Epithel, UA 0-5 None Seen, 0-5 lpf   Culture, Urine   Result Value Ref Range    Culture >100,000 col/ml Escherichia coli (!)     Culture >100,000 col/ml Klebsiella aerogenes (!)        Susceptibility    Klebsiella aerogenes - [place]     Amoxicillin + Clavulanate >16/8 Resistant      Ampicillin >16 Resistant      Cefazolin >16 Resistant      Cefepime <=1 Sensitive      Ceftriaxone <=1 Sensitive      Ciprofloxacin <=0.5 Sensitive      Gentamicin <=2 Sensitive      Levofloxacin <=1 Sensitive      Meropenem <=0.25 Sensitive      Nitrofurantoin 64 Intermediate      Tetracycline <=2 Sensitive      Tobramycin <=2 Sensitive      Trimethoprim + Sulfamethoxazole <=0.5 Sensitive      Escherichia coli - [place]     Amoxicillin + Clavulanate 8/4 Sensitive      Ampicillin <=4 Sensitive      Cefazolin 2 Sensitive      Cefepime <=1 Sensitive      Ceftriaxone <=1 Sensitive      Ciprofloxacin <=0.5 Sensitive      Gentamicin <=2 Sensitive      Levofloxacin <=1 Sensitive      Meropenem <=0.25 Sensitive      Nitrofurantoin <=16 Sensitive      Tetracycline <=2 Sensitive      Tobramycin <=2 Sensitive      Trimethoprim + Sulfamethoxazole <=0.5 Sensitive    ECG 12 lead nursing unit performed   Result Value Ref Range    SYSTOLIC BLOOD PRESSURE 141 mmHg    DIASTOLIC BLOOD PRESSURE 78 mmHg    VENTRICULAR RATE 130 BPM    ATRIAL RATE 133 BPM    P-R INTERVAL  ms    QRS DURATION 92 ms    Q-T INTERVAL 412 ms    QTC CALCULATION (BEZET) 606 ms    P Axis 79 degrees    R AXIS 27 degrees    T AXIS 90 degrees    MUSE DIAGNOSIS       Normal sinus rhythm  frequent PACs and atrial tachycardia   probable multifocal atrial tachycardia  some aberrancy; RBBB  Low voltage QRS  Nonspecific ST and T wave abnormality  Abnormal ECG  When compared with ECG of 16-MAY-2018 12:09,  has not changed  Confirmed by PRIYANKA HDZ MD LOC: ([id number]) on 5/16/2018 5:13:58 PM     Magnesium   Result Value Ref Range    Magnesium 1.0 (LL) 1.8 - 2.6 mg/dL   Potassium   Result Value Ref Range    Potassium 2.2 (LL) 3.5 - 5.0 mmol/L   Potassium   Result Value Ref Range    Potassium 2.8 (LL) 3.5 - 5.0 mmol/L   Basic metabolic panel   Result Value Ref Range    Sodium 130 (L) 136 - 145 mmol/L    Potassium 4.4 3.5 - 5.0 mmol/L    Chloride 94 (L) 98 - 107 mmol/L    CO2 28 22 - 31 mmol/L    Anion Gap, Calculation 8 5 - 18 mmol/L    Glucose 147 (H) 70 - 125 mg/dL    Calcium 8.7 8.5 - 10.5 mg/dL    BUN 4 (L) 8 - 22 mg/dL    Creatinine 0.67 0.60 - 1.10 mg/dL    GFR MDRD Af Amer >60 >60 mL/min/1.73m2    GFR MDRD Non Af Amer >60 >60 mL/min/1.73m2   Hepatic function panel   Result Value Ref Range    Bilirubin, Total 2.9 (H) 0.0 - 1.0 mg/dL    Bilirubin, Direct 1.4  (H) <=0.5 mg/dL    Protein, Total 7.1 6.0 - 8.0 g/dL    Albumin 2.7 (L) 3.5 - 5.0 g/dL    Alkaline Phosphatase 159 (H) 45 - 120 U/L    AST 43 (H) 0 - 40 U/L    ALT 21 0 - 45 U/L   Protime-INR   Result Value Ref Range    INR 1.40 (H) 0.90 - 1.10   Magnesium   Result Value Ref Range    Magnesium 2.9 (H) 1.8 - 2.6 mg/dL   Basic metabolic panel   Result Value Ref Range    Sodium 133 (L) 136 - 145 mmol/L    Potassium 4.3 3.5 - 5.0 mmol/L    Chloride 99 98 - 107 mmol/L    CO2 27 22 - 31 mmol/L    Anion Gap, Calculation 7 5 - 18 mmol/L    Glucose 110 70 - 125 mg/dL    Calcium 8.6 8.5 - 10.5 mg/dL    BUN 5 (L) 8 - 22 mg/dL    Creatinine 0.70 0.60 - 1.10 mg/dL    GFR MDRD Af Amer >60 >60 mL/min/1.73m2    GFR MDRD Non Af Amer >60 >60 mL/min/1.73m2   Hepatic function panel   Result Value Ref Range    Bilirubin, Total 2.4 (H) 0.0 - 1.0 mg/dL    Bilirubin, Direct 1.3 (H) <=0.5 mg/dL    Protein, Total 6.5 6.0 - 8.0 g/dL    Albumin 2.4 (L) 3.5 - 5.0 g/dL    Alkaline Phosphatase 147 (H) 45 - 120 U/L    AST 43 (H) 0 - 40 U/L    ALT 20 0 - 45 U/L   Protime-INR   Result Value Ref Range    INR 1.47 (H) 0.90 - 1.10   Magnesium   Result Value Ref Range    Magnesium 1.6 (L) 1.8 - 2.6 mg/dL   Basic metabolic panel   Result Value Ref Range    Sodium 131 (L) 136 - 145 mmol/L    Potassium 4.6 3.5 - 5.0 mmol/L    Chloride 98 98 - 107 mmol/L    CO2 26 22 - 31 mmol/L    Anion Gap, Calculation 7 5 - 18 mmol/L    Glucose 108 70 - 125 mg/dL    Calcium 9.0 8.5 - 10.5 mg/dL    BUN 4 (L) 8 - 22 mg/dL    Creatinine 0.70 0.60 - 1.10 mg/dL    GFR MDRD Af Amer >60 >60 mL/min/1.73m2    GFR MDRD Non Af Amer >60 >60 mL/min/1.73m2   Hepatic function panel   Result Value Ref Range    Bilirubin, Total 2.6 (H) 0.0 - 1.0 mg/dL    Bilirubin, Direct 1.3 (H) <=0.5 mg/dL    Protein, Total 6.9 6.0 - 8.0 g/dL    Albumin 2.6 (L) 3.5 - 5.0 g/dL    Alkaline Phosphatase 154 (H) 45 - 120 U/L    AST 42 (H) 0 - 40 U/L    ALT 21 0 - 45 U/L   HM1 (CBC with Diff)   Result Value  Ref Range    WBC 4.0 4.0 - 11.0 thou/uL    RBC 3.19 (L) 3.80 - 5.40 mill/uL    Hemoglobin 8.6 (L) 12.0 - 16.0 g/dL    Hematocrit 27.4 (L) 35.0 - 47.0 %    MCV 86 80 - 100 fL    MCH 27.0 27.0 - 34.0 pg    MCHC 31.4 (L) 32.0 - 36.0 g/dL    RDW 17.8 (H) 11.0 - 14.5 %    Platelets 123 (L) 140 - 440 thou/uL    MPV 8.9 8.5 - 12.5 fL    Neutrophils % 65 50 - 70 %    Lymphocytes % 20 20 - 40 %    Monocytes % 12 (H) 2 - 10 %    Eosinophils % 3 0 - 6 %    Basophils % 1 0 - 2 %    Neutrophils Absolute 2.6 2.0 - 7.7 thou/uL    Lymphocytes Absolute 0.8 0.8 - 4.4 thou/uL    Monocytes Absolute 0.5 0.0 - 0.9 thou/uL    Eosinophils Absolute 0.1 0.0 - 0.4 thou/uL    Basophils Absolute 0.0 0.0 - 0.2 thou/uL   Basic metabolic panel   Result Value Ref Range    Sodium 129 (L) 136 - 145 mmol/L    Potassium 4.8 3.5 - 5.0 mmol/L    Chloride 97 (L) 98 - 107 mmol/L    CO2 24 22 - 31 mmol/L    Anion Gap, Calculation 8 5 - 18 mmol/L    Glucose 113 70 - 125 mg/dL    Calcium 9.5 8.5 - 10.5 mg/dL    BUN 5 (L) 8 - 22 mg/dL    Creatinine 0.73 0.60 - 1.10 mg/dL    GFR MDRD Af Amer >60 >60 mL/min/1.73m2    GFR MDRD Non Af Amer >60 >60 mL/min/1.73m2   Hepatic function panel   Result Value Ref Range    Bilirubin, Total 2.7 (H) 0.0 - 1.0 mg/dL    Bilirubin, Direct 1.4 (H) <=0.5 mg/dL    Protein, Total 7.4 6.0 - 8.0 g/dL    Albumin 2.8 (L) 3.5 - 5.0 g/dL    Alkaline Phosphatase 161 (H) 45 - 120 U/L    AST 40 0 - 40 U/L    ALT 22 0 - 45 U/L   Magnesium   Result Value Ref Range    Magnesium 1.5 (L) 1.8 - 2.6 mg/dL   Ammonia   Result Value Ref Range    Ammonia 29 11 - 35 umol/L   Basic metabolic panel   Result Value Ref Range    Sodium 135 (L) 136 - 145 mmol/L    Potassium 3.9 3.5 - 5.0 mmol/L    Chloride 103 98 - 107 mmol/L    CO2 23 22 - 31 mmol/L    Anion Gap, Calculation 9 5 - 18 mmol/L    Glucose 103 70 - 125 mg/dL    Calcium 9.1 8.5 - 10.5 mg/dL    BUN 5 (L) 8 - 22 mg/dL    Creatinine 0.70 0.60 - 1.10 mg/dL    GFR MDRD Af Amer >60 >60 mL/min/1.73m2     GFR MDRD Non Af Amer >60 >60 mL/min/1.73m2   Hepatic function panel   Result Value Ref Range    Bilirubin, Total 2.5 (H) 0.0 - 1.0 mg/dL    Bilirubin, Direct 1.3 (H) <=0.5 mg/dL    Protein, Total 6.9 6.0 - 8.0 g/dL    Albumin 2.6 (L) 3.5 - 5.0 g/dL    Alkaline Phosphatase 160 (H) 45 - 120 U/L    AST 33 0 - 40 U/L    ALT 20 0 - 45 U/L   HM2(CBC W/O DIFF)   Result Value Ref Range    WBC 3.9 (L) 4.0 - 11.0 thou/uL    RBC 3.28 (L) 3.80 - 5.40 mill/uL    Hemoglobin 8.8 (L) 12.0 - 16.0 g/dL    Hematocrit 28.1 (L) 35.0 - 47.0 %    MCV 86 80 - 100 fL    MCH 26.8 (L) 27.0 - 34.0 pg    MCHC 31.3 (L) 32.0 - 36.0 g/dL    RDW 18.0 (H) 11.0 - 14.5 %    Platelets 135 (L) 140 - 440 thou/uL    MPV 8.9 8.5 - 12.5 fL     Results for orders placed or performed during the hospital encounter of 05/16/18   Basic metabolic panel   Result Value Ref Range    Sodium 135 (L) 136 - 145 mmol/L    Potassium 3.9 3.5 - 5.0 mmol/L    Chloride 103 98 - 107 mmol/L    CO2 23 22 - 31 mmol/L    Anion Gap, Calculation 9 5 - 18 mmol/L    Glucose 103 70 - 125 mg/dL    Calcium 9.1 8.5 - 10.5 mg/dL    BUN 5 (L) 8 - 22 mg/dL    Creatinine 0.70 0.60 - 1.10 mg/dL    GFR MDRD Af Amer >60 >60 mL/min/1.73m2    GFR MDRD Non Af Amer >60 >60 mL/min/1.73m2         Lab Results   Component Value Date    WBC 3.9 (L) 05/21/2018    HGB 8.8 (L) 05/21/2018    HCT 28.1 (L) 05/21/2018    MCV 86 05/21/2018     (L) 05/21/2018       Lab Results   Component Value Date    [id number] 1045 (H) 04/23/2018     Lab Results   Component Value Date    HGBA1C 5.7 03/05/2013     Lab Results   Component Value Date    INR 1.47 (H) 05/18/2018    INR 1.40 (H) 05/17/2018    INR 1.29 (H) 05/16/2018     No results found for: [id number]  Lab Results   Component Value Date    TSH 0.65 04/23/2018           ASSESSMENT/PLAN:    1. Alcoholic liver cirrhosis, h/o esophageal varices: Follows with Dr. Browning for GI. Paracentesis prn, had one last Friday 5/18. Has been scheduling about every other  Friday but requesting an additional paracentesis this week. Will have HUC call and schedule through [employer] this week. Abdomen mildly distended. On lasix, spirinolactone.   2. Hepatic encephalopathy: A/o today. On lactulose 20gm two times a day. Ammonia level on 5/23.   3. Hypokalemia: BMP on 5/23. No supplement currently.   4. Hypomagnesemia: On mag ox three times a day, mg level 5/23.   5. Chronic anemia: HM1 on 5/23.   6. Anxiety and depression: On lexapro, mirtazapine.   7. Insomnia: melatonin at bedtime prn.   8. Malnutrition: Dietary following, weight 135.7lbs.   9. UTI: On cefuroxime until 5/31.   10. Chronic pain: On gabapentin two times a day.     CMP, HM1, ammonia, mg level on 5/23    Per therapy eval today    Electronically signed by: Richa Guadarrama NP    Total 35 minutes of which 75% was spent in counseling and coordination of care of the above plan    Time spent in interview and examination of patient, review of available records, and discussion with nursing staff. Continue care plan, efforts at therapy, and monitor nutritional status.

## 2021-06-19 NOTE — PROGRESS NOTES
Arrived via ambulation from us after 8.5L removed via para. Pt denies any difficulties or concerns at this time

## 2021-06-20 NOTE — PROGRESS NOTES
[facility]. [phone].  S: Pt seen bedside for the delivery of her custom TLSO this morning.  Patient reports that she has an paracentesis appointment at 10 am and they will likely take a few liters of fluid.  The TLSO was donned and has sufficient room for tightening post treatment.       O: The custom TLSO was fabricated due to the nature of the L-1 burst Fx OTS options would not provide sufficient support and alignment for the Dx.  Custom TLSO will facilitate the healing process.       A: Pt was fit with the TLSO while in bed.  The TLSO fit as intended and the patient was pleased with the fit and function.  I trimmed a small amount under the arms to prevent any future rubbing and tenderness.  Pt. was educated on the fit and function and asked to follow the physicians recommendation for wear schedule.     P: Pt was given my contact information and asked to contact the [place] office with any questions or concerns after discharge from [facility].   Fitting a Howe TLSO  1. Patient should be supine. Palpate for waist (below ribs but above hips) so you know where to line up waist grooves of the brace.   2. Logroll patient and slide back section of brace under patient lining up waist groove of brace with patient's waist.   3. Roll patient onto their back with the posterior section behind them. Recheck alignment of waist groove on brace with patient's waist. It may be necessary to logroll patient to the opposite side to get posterior section centered on patient, where it extends anteriorly equal amounts on patient's sides. Repeat logrolling side to side as necessary.   4. Once posterior section positioned correctly, lay anterior section on patient. Again, line up waist groove of brace to patient's waist. Waist grooves of anterior and posterior section should match up and fit together. Anterior shell should go over the top of posterior shell. Velcro straps should line up with the chafes/D-rings,  "if they don't, either the anterior or the posterior sections are not in the proper place.   5. Fasten the middle straps first and tighten both sides evenly. Then fasten either top or bottom straps in the same manner. Brace should be snug so as to prevent brace from sliding up on patient. If it is too loose, the brace will slide up and cause discomfort to the patient in the chin and/or axilla area.  6. When properly fit, brace should be about 1\" inferior to the manubrium and about 1.5-2\" inferior to the axilla. The inferior aspect should allow clearance so as not to cut into the tops of the thighs when sitting, but needs to be as low in the pelvic area as possible.     For optimal fit brace should NOT be donned with patient sitting. Ideal fit is achieved by donning in either laying or standing position. Due to changes in belly tissue, it is difficult to achieve a proper fit when patient is sitting.     Brace migration is inevitable, especially when patient is going from laying to upright in bed. Bed will push posterior section of brace up and will push the whole brace up. Migration will also occur when patient is sitting in a hard chair. Don't be afraid to readjust brace and pull it down and back to its proper position.   "

## 2021-06-21 NOTE — PROGRESS NOTES
Pt arrived via ambulation after para 3.4L removed. Denies any problems or concerns. Plan albumin per order

## 2021-07-21 ENCOUNTER — RECORDS - HEALTHEAST (OUTPATIENT)
Dept: ADMINISTRATIVE | Facility: CLINIC | Age: 61
End: 2021-07-21